# Patient Record
Sex: FEMALE | Race: WHITE | URBAN - METROPOLITAN AREA
[De-identification: names, ages, dates, MRNs, and addresses within clinical notes are randomized per-mention and may not be internally consistent; named-entity substitution may affect disease eponyms.]

---

## 2020-02-13 ENCOUNTER — NEW PATIENT (OUTPATIENT)
Dept: URBAN - METROPOLITAN AREA CLINIC 44 | Facility: CLINIC | Age: 70
End: 2020-02-13
Payer: COMMERCIAL

## 2020-02-13 DIAGNOSIS — H43.813 VITREOUS DEGENERATION, BILATERAL: ICD-10-CM

## 2020-02-13 PROCEDURE — 92004 COMPRE OPH EXAM NEW PT 1/>: CPT | Performed by: OPHTHALMOLOGY

## 2020-02-13 PROCEDURE — 92235 FLUORESCEIN ANGRPH MLTIFRAME: CPT | Performed by: OPHTHALMOLOGY

## 2020-02-13 PROCEDURE — 92134 CPTRZ OPH DX IMG PST SGM RTA: CPT | Performed by: OPHTHALMOLOGY

## 2020-02-13 PROCEDURE — 67028 INJECTION EYE DRUG: CPT | Performed by: OPHTHALMOLOGY

## 2020-02-13 ASSESSMENT — INTRAOCULAR PRESSURE
OS: 16
OD: 17

## 2020-03-12 ENCOUNTER — FOLLOW UP ESTABLISHED (OUTPATIENT)
Dept: URBAN - METROPOLITAN AREA CLINIC 44 | Facility: CLINIC | Age: 70
End: 2020-03-12
Payer: COMMERCIAL

## 2020-03-12 PROCEDURE — 92134 CPTRZ OPH DX IMG PST SGM RTA: CPT | Performed by: OPHTHALMOLOGY

## 2020-03-12 PROCEDURE — 92014 COMPRE OPH EXAM EST PT 1/>: CPT | Performed by: OPHTHALMOLOGY

## 2020-03-12 ASSESSMENT — INTRAOCULAR PRESSURE
OS: 14
OD: 12

## 2020-06-02 ENCOUNTER — NEW PATIENT (OUTPATIENT)
Dept: URBAN - METROPOLITAN AREA CLINIC 43 | Facility: CLINIC | Age: 70
End: 2020-06-02
Payer: COMMERCIAL

## 2020-06-02 DIAGNOSIS — H16.143 PUNCTATE KERATITIS, BILATERAL: Primary | ICD-10-CM

## 2020-06-02 PROCEDURE — 92002 INTRM OPH EXAM NEW PATIENT: CPT | Performed by: OPTOMETRIST

## 2020-06-04 ENCOUNTER — FOLLOW UP ESTABLISHED (OUTPATIENT)
Dept: URBAN - METROPOLITAN AREA CLINIC 44 | Facility: CLINIC | Age: 70
End: 2020-06-04
Payer: COMMERCIAL

## 2020-06-04 PROCEDURE — 92014 COMPRE OPH EXAM EST PT 1/>: CPT | Performed by: OPHTHALMOLOGY

## 2020-06-04 PROCEDURE — 92134 CPTRZ OPH DX IMG PST SGM RTA: CPT | Performed by: OPHTHALMOLOGY

## 2020-06-04 RX ORDER — PREDNISOLONE ACETATE 10 MG/ML
1 % SUSPENSION/ DROPS OPHTHALMIC
Qty: 10 | Refills: 1 | Status: INACTIVE
Start: 2020-06-04 | End: 2020-07-23

## 2020-06-04 RX ORDER — KETOROLAC TROMETHAMINE 5 MG/ML
0.5 % SOLUTION OPHTHALMIC
Qty: 10 | Refills: 0 | Status: INACTIVE
Start: 2020-06-04 | End: 2020-06-04

## 2020-06-04 RX ORDER — KETOROLAC TROMETHAMINE 5 MG/ML
0.5 % SOLUTION OPHTHALMIC
Qty: 10 | Refills: 0 | Status: INACTIVE
Start: 2020-06-04 | End: 2020-07-23

## 2020-06-04 RX ORDER — PREDNISOLONE ACETATE 10 MG/ML
1 % SUSPENSION/ DROPS OPHTHALMIC
Qty: 10 | Refills: 1 | Status: INACTIVE
Start: 2020-06-04 | End: 2020-06-04

## 2020-06-04 ASSESSMENT — INTRAOCULAR PRESSURE
OS: 16
OD: 14

## 2020-07-23 ENCOUNTER — FOLLOW UP ESTABLISHED (OUTPATIENT)
Dept: URBAN - METROPOLITAN AREA CLINIC 44 | Facility: CLINIC | Age: 70
End: 2020-07-23
Payer: COMMERCIAL

## 2020-07-23 PROCEDURE — 92134 CPTRZ OPH DX IMG PST SGM RTA: CPT | Performed by: OPHTHALMOLOGY

## 2020-07-23 PROCEDURE — 92014 COMPRE OPH EXAM EST PT 1/>: CPT | Performed by: OPHTHALMOLOGY

## 2020-07-23 RX ORDER — PREDNISOLONE ACETATE 10 MG/ML
1 % SUSPENSION/ DROPS OPHTHALMIC
Qty: 10 | Refills: 3 | Status: INACTIVE
Start: 2020-07-23 | End: 2020-09-28

## 2020-07-23 RX ORDER — KETOROLAC TROMETHAMINE 5 MG/ML
0.5 % SOLUTION OPHTHALMIC
Qty: 10 | Refills: 3 | Status: INACTIVE
Start: 2020-07-23 | End: 2020-09-28

## 2020-07-23 ASSESSMENT — INTRAOCULAR PRESSURE
OS: 18
OD: 13

## 2020-09-28 ENCOUNTER — FOLLOW UP ESTABLISHED (OUTPATIENT)
Dept: URBAN - METROPOLITAN AREA CLINIC 44 | Facility: CLINIC | Age: 70
End: 2020-09-28
Payer: COMMERCIAL

## 2020-09-28 PROCEDURE — 92014 COMPRE OPH EXAM EST PT 1/>: CPT | Performed by: OPHTHALMOLOGY

## 2020-09-28 PROCEDURE — 92134 CPTRZ OPH DX IMG PST SGM RTA: CPT | Performed by: OPHTHALMOLOGY

## 2020-09-28 PROCEDURE — 67028 INJECTION EYE DRUG: CPT | Performed by: OPHTHALMOLOGY

## 2020-09-28 RX ORDER — KETOROLAC TROMETHAMINE 5 MG/ML
0.5 % SOLUTION OPHTHALMIC
Qty: 10 | Refills: 3 | Status: INACTIVE
Start: 2020-09-28 | End: 2021-02-15

## 2020-09-28 RX ORDER — PREDNISOLONE ACETATE 10 MG/ML
1 % SUSPENSION/ DROPS OPHTHALMIC
Qty: 10 | Refills: 3 | Status: INACTIVE
Start: 2020-09-28 | End: 2021-02-01

## 2020-09-28 ASSESSMENT — INTRAOCULAR PRESSURE
OD: 15
OS: 26

## 2020-10-14 ENCOUNTER — FOLLOW UP ESTABLISHED (OUTPATIENT)
Dept: URBAN - METROPOLITAN AREA CLINIC 44 | Facility: CLINIC | Age: 70
End: 2020-10-14
Payer: COMMERCIAL

## 2020-10-14 PROCEDURE — 76514 ECHO EXAM OF EYE THICKNESS: CPT | Performed by: OPTOMETRIST

## 2020-10-14 PROCEDURE — 92133 CPTRZD OPH DX IMG PST SGM ON: CPT | Performed by: OPTOMETRIST

## 2020-10-14 PROCEDURE — 92012 INTRM OPH EXAM EST PATIENT: CPT | Performed by: OPTOMETRIST

## 2020-10-14 RX ORDER — DORZOLAMIDE HCL 20 MG/ML
2 % SOLUTION/ DROPS OPHTHALMIC
Qty: 1 | Refills: 5 | Status: INACTIVE
Start: 2020-10-14 | End: 2021-02-15

## 2020-10-14 RX ORDER — BRIMONIDINE TARTRATE 2 MG/ML
0.2 % SOLUTION/ DROPS OPHTHALMIC
Qty: 1 | Refills: 5 | Status: INACTIVE
Start: 2020-10-14 | End: 2021-02-15

## 2020-10-14 ASSESSMENT — INTRAOCULAR PRESSURE
OS: 47
OD: 18
OS: 48
OD: 20

## 2020-10-20 ENCOUNTER — FOLLOW UP ESTABLISHED (OUTPATIENT)
Dept: URBAN - METROPOLITAN AREA CLINIC 44 | Facility: CLINIC | Age: 70
End: 2020-10-20
Payer: COMMERCIAL

## 2020-10-20 PROCEDURE — 92083 EXTENDED VISUAL FIELD XM: CPT | Performed by: OPTOMETRIST

## 2020-10-20 PROCEDURE — 92012 INTRM OPH EXAM EST PATIENT: CPT | Performed by: OPTOMETRIST

## 2020-10-20 ASSESSMENT — INTRAOCULAR PRESSURE
OS: 16
OD: 11
OD: 21
OS: 21

## 2020-11-16 ENCOUNTER — FOLLOW UP ESTABLISHED (OUTPATIENT)
Dept: URBAN - METROPOLITAN AREA CLINIC 44 | Facility: CLINIC | Age: 70
End: 2020-11-16
Payer: COMMERCIAL

## 2020-11-16 PROCEDURE — 92134 CPTRZ OPH DX IMG PST SGM RTA: CPT | Performed by: OPHTHALMOLOGY

## 2020-11-16 PROCEDURE — 92014 COMPRE OPH EXAM EST PT 1/>: CPT | Performed by: OPHTHALMOLOGY

## 2020-11-16 ASSESSMENT — INTRAOCULAR PRESSURE
OD: 14
OS: 28

## 2021-02-01 ENCOUNTER — FOLLOW UP ESTABLISHED (OUTPATIENT)
Dept: URBAN - METROPOLITAN AREA CLINIC 44 | Facility: CLINIC | Age: 71
End: 2021-02-01
Payer: COMMERCIAL

## 2021-02-01 PROCEDURE — 99214 OFFICE O/P EST MOD 30 MIN: CPT | Performed by: OPTOMETRIST

## 2021-02-01 ASSESSMENT — INTRAOCULAR PRESSURE
OS: 13
OD: 10

## 2021-02-15 ENCOUNTER — FOLLOW UP ESTABLISHED (OUTPATIENT)
Dept: URBAN - METROPOLITAN AREA CLINIC 44 | Facility: CLINIC | Age: 71
End: 2021-02-15
Payer: COMMERCIAL

## 2021-02-15 PROCEDURE — 67028 INJECTION EYE DRUG: CPT | Performed by: OPHTHALMOLOGY

## 2021-02-15 PROCEDURE — 92014 COMPRE OPH EXAM EST PT 1/>: CPT | Performed by: OPHTHALMOLOGY

## 2021-02-15 PROCEDURE — 92134 CPTRZ OPH DX IMG PST SGM RTA: CPT | Performed by: OPHTHALMOLOGY

## 2021-02-15 RX ORDER — KETOROLAC TROMETHAMINE 5 MG/ML
0.5 % SOLUTION OPHTHALMIC
Qty: 10 | Refills: 3 | Status: INACTIVE
Start: 2021-02-15 | End: 2021-04-02

## 2021-02-15 RX ORDER — DORZOLAMIDE HCL 20 MG/ML
2 % SOLUTION/ DROPS OPHTHALMIC
Qty: 2.5 | Refills: 5 | Status: INACTIVE
Start: 2021-02-15 | End: 2021-04-02

## 2021-02-15 RX ORDER — BRIMONIDINE TARTRATE 2 MG/ML
0.2 % SOLUTION/ DROPS OPHTHALMIC
Qty: 2.5 | Refills: 5 | Status: INACTIVE
Start: 2021-02-15 | End: 2021-04-02

## 2021-02-15 ASSESSMENT — INTRAOCULAR PRESSURE
OS: 27
OD: 15

## 2021-03-22 ENCOUNTER — FOLLOW UP ESTABLISHED (OUTPATIENT)
Dept: URBAN - METROPOLITAN AREA CLINIC 44 | Facility: CLINIC | Age: 71
End: 2021-03-22
Payer: COMMERCIAL

## 2021-03-22 PROCEDURE — 99214 OFFICE O/P EST MOD 30 MIN: CPT | Performed by: OPTOMETRIST

## 2021-03-22 PROCEDURE — 92134 CPTRZ OPH DX IMG PST SGM RTA: CPT | Performed by: OPTOMETRIST

## 2021-03-22 ASSESSMENT — INTRAOCULAR PRESSURE
OD: 17
OS: 49
OS: 43

## 2021-03-24 ENCOUNTER — FOLLOW UP ESTABLISHED (OUTPATIENT)
Dept: URBAN - METROPOLITAN AREA CLINIC 44 | Facility: CLINIC | Age: 71
End: 2021-03-24
Payer: COMMERCIAL

## 2021-03-24 PROCEDURE — 99213 OFFICE O/P EST LOW 20 MIN: CPT | Performed by: OPTOMETRIST

## 2021-03-24 ASSESSMENT — INTRAOCULAR PRESSURE
OS: 20
OS: 40
OD: 20
OD: 16

## 2021-04-02 ENCOUNTER — OFFICE VISIT (OUTPATIENT)
Dept: URBAN - METROPOLITAN AREA CLINIC 44 | Facility: CLINIC | Age: 71
End: 2021-04-02
Payer: COMMERCIAL

## 2021-04-02 DIAGNOSIS — H40.052 OCULAR HYPERTENSION, LEFT EYE: Primary | ICD-10-CM

## 2021-04-02 DIAGNOSIS — H04.123 TEAR FILM INSUFFICIENCY OF BILATERAL LACRIMAL GLANDS: ICD-10-CM

## 2021-04-02 PROCEDURE — 92082 INTERMEDIATE VISUAL FIELD XM: CPT | Performed by: OPHTHALMOLOGY

## 2021-04-02 PROCEDURE — 92020 GONIOSCOPY: CPT | Performed by: OPHTHALMOLOGY

## 2021-04-02 PROCEDURE — 92014 COMPRE OPH EXAM EST PT 1/>: CPT | Performed by: OPHTHALMOLOGY

## 2021-04-02 RX ORDER — NETARSUDIL 0.2 MG/ML
0.02 % SOLUTION/ DROPS OPHTHALMIC; TOPICAL
Qty: 2.5 | Refills: 2 | Status: INACTIVE
Start: 2021-04-02 | End: 2021-08-24

## 2021-04-02 RX ORDER — BRIMONIDINE TARTRATE 2 MG/ML
0.2 % SOLUTION/ DROPS OPHTHALMIC
Qty: 2.5 | Refills: 2 | Status: INACTIVE
Start: 2021-04-02 | End: 2021-06-21

## 2021-04-02 RX ORDER — DORZOLAMIDE HYDROCHLORIDE AND TIMOLOL MALEATE 20; 5 MG/ML; MG/ML
SOLUTION/ DROPS OPHTHALMIC
Qty: 5 | Refills: 2 | Status: INACTIVE
Start: 2021-04-02 | End: 2021-06-21

## 2021-04-02 ASSESSMENT — INTRAOCULAR PRESSURE
OS: 34
OD: 22

## 2021-04-02 NOTE — IMPRESSION/PLAN
Impression: Ocular hypertension, left eye Hx of CME OS -- no PGA  ; Denies lung problems 
   ; Patient denies sulfa allergy or kidney problems ;  Steroid responder Plan: PLAN: On brimonidine TID OS, dorzolamide TID OS OFF diamox  ( has side effects)  ; FDT shows dense depression OS and full OD. IOP is sub-optimal os, likely steroid -related , pt asked again - denies any lung problems and denies cough - will start delfino but told to stop if has any side effects  ,  cont brim tid os, change dorzol to cosopt bid os, add rhopressa qhs os, and rtc in 1 week , consider tritsan Man for possible surgical options also        ((TARGET ~< to determine OU)) /son present TESTS:
04/02/21 FDT - OD: Good-overall full; OS: Good-dense superior and inferior depression. See today's plan for further clinical correlation and recommendations based on test results and other clinical findings. Discussed glaucoma diagnosis in detail with patient. Emphasized and explained compliance. Poor compliance can lead to blindness.  Educational materials provided

## 2021-04-02 NOTE — IMPRESSION/PLAN
Impression: Vitreous degeneration, bilateral Plan: Discussed signs and symptoms of retinal detachment (flashes,floaters, curtain) as precaution . Patient instructed to call or return to clinic if condition gets worse.

## 2021-04-08 ENCOUNTER — OFFICE VISIT (OUTPATIENT)
Dept: URBAN - METROPOLITAN AREA CLINIC 44 | Facility: CLINIC | Age: 71
End: 2021-04-08
Payer: COMMERCIAL

## 2021-04-08 PROCEDURE — 99213 OFFICE O/P EST LOW 20 MIN: CPT | Performed by: OPTOMETRIST

## 2021-04-08 ASSESSMENT — INTRAOCULAR PRESSURE
OS: 17
OD: 15
OS: 23
OD: 16

## 2021-04-08 NOTE — IMPRESSION/PLAN
Impression: Ocular hypertension, left eye Plan: Cupping OS>OD Pachymetry: 688/107 IOP: 16/23 Current meds: brimonidine TID OS, cosopt BID OS, rhopressa QHS OS Hx of CME OS -- no PGA Hx of chronic cough -- no beta blockers Patient denies sulfa allergy or kidney problems Steroid responder OCT RNFL (10/14/20): OD 82 (wnl) OS 97 (wnl) HVF (10/20/20): OD nasal step (unreliable) OS mild nasal defect (reliable) IOP improved after switching meds but IOP suboptimal OS. Continue current meds.   Refer to Dr. Alfonso Weems

## 2021-04-15 ENCOUNTER — OFFICE VISIT (OUTPATIENT)
Dept: URBAN - METROPOLITAN AREA CLINIC 44 | Facility: CLINIC | Age: 71
End: 2021-04-15
Payer: COMMERCIAL

## 2021-04-15 DIAGNOSIS — H35.352 CYSTOID MACULAR DEGENERATION, LEFT EYE: Primary | ICD-10-CM

## 2021-04-15 PROCEDURE — 92134 CPTRZ OPH DX IMG PST SGM RTA: CPT | Performed by: OPHTHALMOLOGY

## 2021-04-15 PROCEDURE — 92014 COMPRE OPH EXAM EST PT 1/>: CPT | Performed by: OPHTHALMOLOGY

## 2021-04-15 RX ORDER — KETOROLAC TROMETHAMINE 5 MG/ML
0.5 % SOLUTION OPHTHALMIC
Qty: 10 | Refills: 3 | Status: INACTIVE
Start: 2021-04-15 | End: 2021-04-15

## 2021-04-15 RX ORDER — KETOROLAC TROMETHAMINE 5 MG/ML
0.5 % SOLUTION OPHTHALMIC
Qty: 10 | Refills: 3 | Status: INACTIVE
Start: 2021-04-15 | End: 2021-06-21

## 2021-04-15 ASSESSMENT — INTRAOCULAR PRESSURE
OS: 14
OD: 10

## 2021-04-15 NOTE — IMPRESSION/PLAN
Impression: Vitreous degeneration, bilateral Plan: no retinal breaks detected. s/sx RD and tears reviewed. call if any new symptoms develop.

## 2021-05-26 NOTE — IMPRESSION/PLAN
Impression: Cystoid macular degeneration, left eye Plan: CME resolved 2 month s/p IVTA, hx of recurrence. cont ketorolac TID. will monitor RTC 2 months done done

## 2021-06-21 ENCOUNTER — OFFICE VISIT (OUTPATIENT)
Dept: URBAN - METROPOLITAN AREA CLINIC 44 | Facility: CLINIC | Age: 71
End: 2021-06-21
Payer: COMMERCIAL

## 2021-06-21 PROCEDURE — 92134 CPTRZ OPH DX IMG PST SGM RTA: CPT | Performed by: OPHTHALMOLOGY

## 2021-06-21 PROCEDURE — 92014 COMPRE OPH EXAM EST PT 1/>: CPT | Performed by: OPHTHALMOLOGY

## 2021-06-21 RX ORDER — KETOROLAC TROMETHAMINE 5 MG/ML
0.5 % SOLUTION OPHTHALMIC
Qty: 10 | Refills: 3 | Status: ACTIVE
Start: 2021-06-21

## 2021-06-21 RX ORDER — BRIMONIDINE TARTRATE 2 MG/ML
0.2 % SOLUTION/ DROPS OPHTHALMIC
Qty: 2.5 | Refills: 2 | Status: INACTIVE
Start: 2021-06-21 | End: 2021-08-24

## 2021-06-21 RX ORDER — DORZOLAMIDE HYDROCHLORIDE AND TIMOLOL MALEATE 20; 5 MG/ML; MG/ML
SOLUTION/ DROPS OPHTHALMIC
Qty: 5 | Refills: 2 | Status: INACTIVE
Start: 2021-06-21 | End: 2021-08-24

## 2021-06-21 ASSESSMENT — INTRAOCULAR PRESSURE
OD: 12
OS: 20

## 2021-06-21 NOTE — IMPRESSION/PLAN
Impression: Ocular hypertension, left eye Plan: cont care w Dr Vannesa Machado, IOP elevated OD, sent refills of drops

## 2021-06-21 NOTE — IMPRESSION/PLAN
Impression: Cystoid macular degeneration, left eye Plan: CME remains resolved 4 month s/p IVTA, hx of recurrence. cont ketorolac TID. will hold off on injection today and continue to monitor RTC 2 months

## 2021-08-19 ENCOUNTER — OFFICE VISIT (OUTPATIENT)
Dept: URBAN - METROPOLITAN AREA CLINIC 44 | Facility: CLINIC | Age: 71
End: 2021-08-19
Payer: COMMERCIAL

## 2021-08-19 DIAGNOSIS — H02.825 CYSTS OF LEFT LOWER EYELID: ICD-10-CM

## 2021-08-19 PROCEDURE — 92134 CPTRZ OPH DX IMG PST SGM RTA: CPT | Performed by: OPHTHALMOLOGY

## 2021-08-19 PROCEDURE — 67028 INJECTION EYE DRUG: CPT | Performed by: OPHTHALMOLOGY

## 2021-08-19 PROCEDURE — 92014 COMPRE OPH EXAM EST PT 1/>: CPT | Performed by: OPHTHALMOLOGY

## 2021-08-19 ASSESSMENT — INTRAOCULAR PRESSURE
OS: 19
OD: 16

## 2021-08-19 NOTE — IMPRESSION/PLAN
Impression: Cystoid macular degeneration, left eye: H35.352. Plan: increased edema 6 months s/p IVTA. will repeat IVTA OS today.  cont Ketorolac TID

RTC 2 months

## 2021-08-24 ENCOUNTER — OFFICE VISIT (OUTPATIENT)
Dept: URBAN - METROPOLITAN AREA CLINIC 44 | Facility: CLINIC | Age: 71
End: 2021-08-24
Payer: COMMERCIAL

## 2021-08-24 DIAGNOSIS — H40.1114 PRIMARY OPEN-ANGLE GLAUCOMA, RIGHT EYE, INDETERMINATE STAGE: ICD-10-CM

## 2021-08-24 PROCEDURE — 92020 GONIOSCOPY: CPT | Performed by: OPHTHALMOLOGY

## 2021-08-24 PROCEDURE — 92014 COMPRE OPH EXAM EST PT 1/>: CPT | Performed by: OPHTHALMOLOGY

## 2021-08-24 PROCEDURE — 92083 EXTENDED VISUAL FIELD XM: CPT | Performed by: OPHTHALMOLOGY

## 2021-08-24 PROCEDURE — 92133 CPTRZD OPH DX IMG PST SGM ON: CPT | Performed by: OPHTHALMOLOGY

## 2021-08-24 RX ORDER — NETARSUDIL 0.2 MG/ML
0.02 % SOLUTION/ DROPS OPHTHALMIC; TOPICAL
Qty: 7.5 | Refills: 2 | Status: INACTIVE
Start: 2021-08-24 | End: 2022-02-07

## 2021-08-24 RX ORDER — ACETAZOLAMIDE 250 MG/1
250 MG TABLET ORAL
Qty: 90 | Refills: 0 | Status: ACTIVE
Start: 2021-08-24

## 2021-08-24 ASSESSMENT — VISUAL ACUITY
OS: 20/25
OD: 20/25

## 2021-08-24 ASSESSMENT — INTRAOCULAR PRESSURE
OS: 36
OD: 21

## 2021-08-24 NOTE — IMPRESSION/PLAN
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 

1 Plan: Trabeculectomy is recommended. Risks, benefits, alternatives to surgery discussed including pain, bleeding, loss of vision, loss of eye, need for further surgery, double vision, retinal problems, infection, and inflammation. Although surgery is expected to improve the condition, the patient understands it is possible that the condition could worsen in the future. The surgery is being done in an attempt to preserve the current level of vision; vision improvement is NOT expected. Glasses will be necessary for sharpest vision after surgery. Patient advised that ongoing uncontrolled glaucoma may result in permanent vision loss. Discussed in detail the commitment of post operative care, patient may need to travel to different offices. Discussed activity restrictions: no bending head below waist, rubbing the eye, straining or lifting over 10 lbs, stay out of michael, dirty areas and shield when sleeping until advised. All questions answered.

## 2021-08-24 NOTE — IMPRESSION/PLAN
Impression: Primary open-angle glaucoma, right eye, indeterminate stage: H40.1114.  Plan: see [id number]

## 2021-08-30 NOTE — IMPRESSION/PLAN
Impression: Other specified glaucoma: H40.89.


2 Plan: Patient wishes to proceed with surgery. Schedule: Trabeculectomy OS, H&P, POD1, POW1, POW2, POW3, POW4, POW5, POW6, POW7 and POW8
-Patient to start FML QID [id number] prior to Trabeculectomy if able. -Start Ofloxacin QID OS one day prior to prior to Trabeculectomy. -ERx'd FML QID OS, Ofloxacin QID OS and Durezol QID OS as requested. 
-Continue Glaucoma Drops: Start Brimonidine TID OU (from OS only) and DIAMOX 250mg PO BID. Continue Rhopressa QHS OS and Cosopt BID OS. -Patient will continue glaucoma drops as directed until surgery. After surgery, patient will stop glaucoma drops ONLY in the surgical eye. Start surgical drops when eye patch is removed 5hrs after surgery.

## 2021-09-02 ENCOUNTER — ADULT PHYSICAL (OUTPATIENT)
Dept: URBAN - METROPOLITAN AREA CLINIC 44 | Facility: CLINIC | Age: 71
End: 2021-09-02
Payer: COMMERCIAL

## 2021-09-02 PROCEDURE — 99203 OFFICE O/P NEW LOW 30 MIN: CPT | Performed by: PHYSICIAN ASSISTANT

## 2021-09-02 ASSESSMENT — PACHYMETRY
OD: 23.41
OS: 23.49

## 2021-10-14 ENCOUNTER — OFFICE VISIT (OUTPATIENT)
Dept: URBAN - METROPOLITAN AREA CLINIC 44 | Facility: CLINIC | Age: 71
End: 2021-10-14
Payer: COMMERCIAL

## 2021-10-14 PROCEDURE — 92014 COMPRE OPH EXAM EST PT 1/>: CPT | Performed by: OPHTHALMOLOGY

## 2021-10-14 PROCEDURE — 92134 CPTRZ OPH DX IMG PST SGM RTA: CPT | Performed by: OPHTHALMOLOGY

## 2021-10-14 ASSESSMENT — INTRAOCULAR PRESSURE
OS: 36
OD: 23

## 2021-10-14 NOTE — IMPRESSION/PLAN
Impression: Cystoid macular degeneration, left eye: H35.352. Plan: s/p IVTA 8/2021. CME now resolved. high risk for recurrence. long discussion of d/c tx, risk for irrecoverable vision loss, pt elects to d/c tx and monitor for now. 

RTC 2 months

## 2021-10-14 NOTE — IMPRESSION/PLAN
Impression: Ocular hypertension, left eye Plan: IOP 36, cont drops, recommend cont with surgery as recommended by Dr Rossy Altman. long discussions, pt elects to defer all injections and surgery. understands risk of irrecoverable vision loss. will call if wanting to proceed.  cont all drops as directed

## 2021-10-21 ENCOUNTER — OFFICE VISIT (OUTPATIENT)
Dept: URBAN - METROPOLITAN AREA CLINIC 44 | Facility: CLINIC | Age: 71
End: 2021-10-21
Payer: COMMERCIAL

## 2021-10-21 DIAGNOSIS — H02.831 DERMATOCHALASIS OF RIGHT UPPER EYELID: Primary | ICD-10-CM

## 2021-10-21 PROCEDURE — 92012 INTRM OPH EXAM EST PATIENT: CPT | Performed by: OPHTHALMOLOGY

## 2021-10-21 PROCEDURE — 92081 LIMITED VISUAL FIELD XM: CPT | Performed by: OPHTHALMOLOGY

## 2021-10-21 PROCEDURE — 92285 EXTERNAL OCULAR PHOTOGRAPHY: CPT | Performed by: OPHTHALMOLOGY

## 2021-10-21 RX ORDER — NEOMYCIN SULFATE, POLYMYXIN B SULFATE AND DEXAMETHASONE 3.5; 10000; 1 MG/G; [USP'U]/G; MG/G
OINTMENT OPHTHALMIC
Qty: 1 | Refills: 3 | Status: INACTIVE
Start: 2021-10-21 | End: 2021-10-21

## 2021-10-21 NOTE — IMPRESSION/PLAN
Impression: Dermatochalasis of right upper eyelid: H02.831. Plan: Visual Fields & photos were performed and interpreted today and demonstrated restriction of superior and temporal visual fields. This reverted to normal, demonstrating >30% improvement in visual field with mechanical elevation of the eyelid and brow. To correct the loss of superior and temporal VF caused by redundant upper eyelid skin and muscle, I recommended performing an upper lid blepharoplasty. Discussed the R/B/A of this procedure, all questions were answered. Visual field shows restriction of superior and temporal visual fields in resting position. With eyelids/brows elevated/taped there is a significant (>30%) improvement in the superior and temporal visual fields. skin and medial fat OU. explained that lateral hooding would still be present OU without a cosmetic brow lift. Patient voiced understanding and would like to move forward with BULB only.

## 2021-11-24 ENCOUNTER — ADULT PHYSICAL (OUTPATIENT)
Dept: URBAN - METROPOLITAN AREA CLINIC 44 | Facility: CLINIC | Age: 71
End: 2021-11-24
Payer: COMMERCIAL

## 2021-11-24 DIAGNOSIS — H02.834 DERMATOCHALASIS OF LEFT UPPER EYELID: ICD-10-CM

## 2021-11-24 DIAGNOSIS — Z01.818 ENCOUNTER FOR OTHER PREPROCEDURAL EXAMINATION: Primary | ICD-10-CM

## 2021-11-24 PROCEDURE — 99213 OFFICE O/P EST LOW 20 MIN: CPT

## 2021-12-08 ENCOUNTER — OFFICE VISIT (OUTPATIENT)
Dept: URBAN - METROPOLITAN AREA CLINIC 10 | Facility: CLINIC | Age: 71
End: 2021-12-08
Payer: COMMERCIAL

## 2021-12-08 DIAGNOSIS — H40.89 OTHER SPECIFIED GLAUCOMA: ICD-10-CM

## 2021-12-08 PROCEDURE — 92014 COMPRE OPH EXAM EST PT 1/>: CPT | Performed by: OPHTHALMOLOGY

## 2021-12-08 PROCEDURE — 92134 CPTRZ OPH DX IMG PST SGM RTA: CPT | Performed by: OPHTHALMOLOGY

## 2021-12-08 ASSESSMENT — INTRAOCULAR PRESSURE
OD: 20
OS: 33

## 2021-12-08 NOTE — IMPRESSION/PLAN
Impression: Cystoid macular degeneration, left eye: H35.352. Plan: s/p IVTA 8/2021. CME increased from prior. long discussion about injection tx, understands we CANNOT proceed with further injections without also proceeding with glaucoma procedure. pt elects to proceed with Ozurdex Injection and will schedule for glaucoma surgery RTC 1 month ND Ozurdex OS

## 2021-12-08 NOTE — IMPRESSION/PLAN
Impression: Ocular hypertension, left eye Plan: IOP 33, cont drops, pt elects to proceed with surgery.  will reach out to glaucoma team

## 2021-12-09 ENCOUNTER — SURGERY (OUTPATIENT)
Dept: URBAN - METROPOLITAN AREA SURGERY 19 | Facility: SURGERY | Age: 71
End: 2021-12-09
Payer: COMMERCIAL

## 2021-12-23 ENCOUNTER — POST-OPERATIVE VISIT (OUTPATIENT)
Dept: URBAN - METROPOLITAN AREA CLINIC 33 | Facility: CLINIC | Age: 71
End: 2021-12-23
Payer: COMMERCIAL

## 2021-12-23 DIAGNOSIS — Z48.89 ENCOUNTER FOR OTHER SPECIFIED SURGICAL AFTERCARE: Primary | ICD-10-CM

## 2021-12-23 PROCEDURE — 99024 POSTOP FOLLOW-UP VISIT: CPT | Performed by: OPHTHALMOLOGY

## 2021-12-23 NOTE — IMPRESSION/PLAN
Impression:  Encounter for other specified surgical aftercare  Z48.89. Plan: Sutures removed in office today; healing well. explained that swelling will improve over time. RTC PRN.

## 2022-02-07 ENCOUNTER — OFFICE VISIT (OUTPATIENT)
Dept: URBAN - METROPOLITAN AREA CLINIC 44 | Facility: CLINIC | Age: 72
End: 2022-02-07
Payer: COMMERCIAL

## 2022-02-07 PROCEDURE — 92134 CPTRZ OPH DX IMG PST SGM RTA: CPT | Performed by: OPHTHALMOLOGY

## 2022-02-07 PROCEDURE — 92014 COMPRE OPH EXAM EST PT 1/>: CPT | Performed by: OPHTHALMOLOGY

## 2022-02-07 RX ORDER — NETARSUDIL 0.2 MG/ML
0.02 % SOLUTION/ DROPS OPHTHALMIC; TOPICAL
Qty: 7.5 | Refills: 2 | Status: ACTIVE
Start: 2022-02-07

## 2022-02-07 RX ORDER — DUREZOL 0.5 MG/ML
0.05 % EMULSION OPHTHALMIC
Qty: 10 | Refills: 3 | Status: ACTIVE
Start: 2022-02-07

## 2022-02-07 RX ORDER — BRIMONIDINE TARTRATE 2 MG/ML
0.2 % SOLUTION/ DROPS OPHTHALMIC
Qty: 15 | Refills: 2 | Status: ACTIVE
Start: 2022-02-07

## 2022-02-07 RX ORDER — DORZOLAMIDE HYDROCHLORIDE AND TIMOLOL MALEATE 20; 5 MG/ML; MG/ML
SOLUTION/ DROPS OPHTHALMIC
Qty: 15 | Refills: 2 | Status: INACTIVE
Start: 2022-02-07 | End: 2022-02-07

## 2022-02-07 RX ORDER — DUREZOL 0.5 MG/ML
0.05 % EMULSION OPHTHALMIC
Qty: 10 | Refills: 3 | Status: INACTIVE
Start: 2022-02-07 | End: 2022-02-07

## 2022-02-07 RX ORDER — BRIMONIDINE TARTRATE 2 MG/ML
0.2 % SOLUTION/ DROPS OPHTHALMIC
Qty: 15 | Refills: 2 | Status: INACTIVE
Start: 2022-02-07 | End: 2022-02-07

## 2022-02-07 RX ORDER — DORZOLAMIDE HYDROCHLORIDE AND TIMOLOL MALEATE 20; 5 MG/ML; MG/ML
SOLUTION/ DROPS OPHTHALMIC
Qty: 15 | Refills: 2 | Status: ACTIVE
Start: 2022-02-07

## 2022-02-07 ASSESSMENT — INTRAOCULAR PRESSURE
OD: 27
OS: 42

## 2022-02-07 NOTE — IMPRESSION/PLAN
Impression: Cystoid macular degeneration, left eye: H35.352. Plan: s/p IVTA 8/2021, s/p Ozurdex OS 12/2021. non-complaint with previous recommendations, did no follow up with glaucoma. have had several long discussions about injection treatments and glaucoma surgery. IOP now 42, will not proceed with additional CME treatment until she schedules with glaucoma. high for irrecoverable vision loss without glaucoma surgery. 

RTC ASAP glaucoma

## 2022-02-23 ENCOUNTER — OFFICE VISIT (OUTPATIENT)
Dept: URBAN - METROPOLITAN AREA CLINIC 44 | Facility: CLINIC | Age: 72
End: 2022-02-23
Payer: COMMERCIAL

## 2022-02-23 DIAGNOSIS — H20.012 PRIMARY IRIDOCYCLITIS OF LEFT EYE: Primary | ICD-10-CM

## 2022-02-23 PROCEDURE — 99214 OFFICE O/P EST MOD 30 MIN: CPT | Performed by: OPTOMETRIST

## 2022-02-23 PROCEDURE — 92133 CPTRZD OPH DX IMG PST SGM ON: CPT | Performed by: OPTOMETRIST

## 2022-02-23 RX ORDER — PREDNISOLONE ACETATE 10 MG/ML
1 % SUSPENSION/ DROPS OPHTHALMIC
Qty: 5 | Refills: 0 | Status: ACTIVE
Start: 2022-02-23

## 2022-02-23 ASSESSMENT — INTRAOCULAR PRESSURE
OS: 16
OD: 20

## 2022-02-23 NOTE — IMPRESSION/PLAN
Impression: Ocular hypertension, left eye
=IOP OD 20, OS 16. 
-Vertical cupping OD .71 OS . 70 with RNFL average OD 82 OS 80 Plan: PLAN: Cont current medications and RTC w Dr Helen Caban as sched

## 2022-02-23 NOTE — IMPRESSION/PLAN
Impression: Primary iridocyclitis of left eye: H20.012.  Plan: PLAN: Discussed findings with patient, and start Pred Forte 1 drop TID OS and RTC 1 week for short Alternatives Discussed Intro (Do Not Add Period): I discussed alternative treatments to Mohs surgery and specifically discussed the risks and benefits of

## 2022-03-09 ENCOUNTER — OFFICE VISIT (OUTPATIENT)
Dept: URBAN - METROPOLITAN AREA CLINIC 44 | Facility: CLINIC | Age: 72
End: 2022-03-09
Payer: COMMERCIAL

## 2022-03-09 PROCEDURE — 99214 OFFICE O/P EST MOD 30 MIN: CPT | Performed by: OPHTHALMOLOGY

## 2022-03-09 PROCEDURE — 92083 EXTENDED VISUAL FIELD XM: CPT | Performed by: OPHTHALMOLOGY

## 2022-03-09 ASSESSMENT — INTRAOCULAR PRESSURE
OS: 31
OD: 20

## 2022-03-09 NOTE — IMPRESSION/PLAN
Impression: Other specified glaucoma: H40.89.


2 Plan: PT HAS POAG OS
PT IS UNDER CARE OF DR Antwon Sanabria, PT DEFERRED TRABECULECTOMY OS X 2 PT DENIES FAMILY HX OF GLAUCOMA
PT DENIES SULFA ALLERGY 
PT DENIES LUNG DZ 
TARGET IOP MID TEENS OR LESS 
RECOMMEND : 
1. CONTINUE BRIMONIDINE TID OS 2. CONTINUE BID OS 3. CONTINUE CARE WITH DR HUTSON TO PROCEED WITH STEROID MONITORING AND TAPER WHEN IT IS SAFE 4. CONTINUE CARE WITH DR GOLISCH SO SHE CAN DETERMINE IF SHE WISHES TO UNDERGO SURGICAL INTERVENTION 5. VF OS ON 3/9/22 REVEALS NOTABLE PROGRESSIVE LOSS, WE DISCUSSED WITH THE PT AND HER SON THAT FAILURE TO FOLLOW THE PLAN OF DR Antwon Sanabria WILL LEAVE HER AT RISK FOR PROGRESSIVE GLAUCOMATOUS SIGHT LOSS

## 2022-03-24 ENCOUNTER — OFFICE VISIT (OUTPATIENT)
Dept: URBAN - METROPOLITAN AREA CLINIC 44 | Facility: CLINIC | Age: 72
End: 2022-03-24
Payer: COMMERCIAL

## 2022-03-24 DIAGNOSIS — H57.12 OCULAR PAIN, LEFT EYE: Primary | ICD-10-CM

## 2022-03-24 PROCEDURE — 92134 CPTRZ OPH DX IMG PST SGM RTA: CPT | Performed by: OPTOMETRIST

## 2022-03-24 PROCEDURE — 99213 OFFICE O/P EST LOW 20 MIN: CPT | Performed by: OPTOMETRIST

## 2022-03-24 ASSESSMENT — INTRAOCULAR PRESSURE
OS: 23
OD: 17

## 2022-03-24 NOTE — IMPRESSION/PLAN
Impression: Ocular pain, left eye: H57.12. Non specific. Further questions indicated FB type pain, but constant 24/7. 1-2+ Diffuse SPK not consistent with symptoms. States no improvement with Pred Forte. Review of Hx shows current problem appears to be recurrent with no relieve from previous Tx. Plan: PLAN: Discussed findings. Rec Celluvisc 4X or more. RTC 1 week for office visit. Consider retinal consult if no better.

## 2022-03-24 NOTE — IMPRESSION/PLAN
Impression: Other specified glaucoma: H40.89. IOP OD 17, OS 23. 
PT HAS POAG OS
PT IS UNDER CARE OF DR Gabriel Mcintosh, PT DEFERRED TRABECULECTOMY OS X 2 PT DENIES FAMILY HX OF GLAUCOMA
PT DENIES SULFA ALLERGY 
PT DENIES LUNG DZ 
TARGET IOP MID TEENS OR LESS Plan: RECOMMEND : 
1. CONTINUE BRIMONIDINE TID OS 2. CONTINUE COSOPT BID OS 3. CONTINUE CARE WITH DR GOLISCH SO SHE CAN DETERMINE IF SHE WISHES TO UNDERGO SURGICAL INTERVENTION 5. VF OS ON 3/9/22 REVEALS NOTABLE PROGRESSIVE LOSS, WE DISCUSSED WITH THE PT AND HER SON THAT FAILURE TO FOLLOW THE PLAN OF DR Gabriel Mcintosh WILL LEAVE HER AT RISK FOR PROGRESSIVE GLAUCOMATOUS SIGHT LOSS

## 2022-03-31 ENCOUNTER — OFFICE VISIT (OUTPATIENT)
Dept: URBAN - METROPOLITAN AREA CLINIC 44 | Facility: CLINIC | Age: 72
End: 2022-03-31
Payer: COMMERCIAL

## 2022-03-31 PROCEDURE — 99213 OFFICE O/P EST LOW 20 MIN: CPT | Performed by: OPTOMETRIST

## 2022-03-31 ASSESSMENT — INTRAOCULAR PRESSURE
OD: 19
OS: 36

## 2022-03-31 NOTE — IMPRESSION/PLAN
Impression: Other specified glaucoma: H40.89. IOP OD 19, OS 36. Patient D/C Dorzolamide/timolol due to suspect intolerance per patient. States eye felt slightly better when stopping drop. (D/C 2 days ago) PT HAS POAG OS
PT IS UNDER CARE OF DR Lauren Hilliard, PT DEFERRED TRABECULECTOMY OS X 2 PT DENIES FAMILY HX OF GLAUCOMA
PT DENIES SULFA ALLERGY 
PT DENIES LUNG DZ 
TARGET IOP MID TEENS OR LESS Plan: PLAN: Discussed findings. 1. CONTINUE BRIMONIDINE TID OS 2. Restart Dorzolimide/timolol  BID OS 3.  RTC 1 week to check IOP

## 2022-03-31 NOTE — IMPRESSION/PLAN
Impression: Ocular pain, left eye: H57.12. Non specific. Further questions indicated FB type pain, SLE findings consistent with DED. 2++ superior SPK with mild diffuse SPK. States Celluvisc has helped , but no improvement with Pred Forte. Review of Hx shows current problem appears to be recurrent with no relieve from previous Tx. Plan: PLAN: Discussed findings. Rec cont Celluvisc 4X or more. RTC 1 week.

## 2022-03-31 NOTE — IMPRESSION/PLAN
Impression: Tear film insufficiency of bilateral lacrimal glands: H04.123. Patient with symptomatic DED. Clinical evaluation shows moderate DED clinical signs Plan: PLAN: Lipid based tears to be used 3-4 X daily. Celluvisc QID OS. Rec. 2000 mg Triglyceride based fish oil (Example: Coromega, PRN) to be taken daily. RTC 1 week. States no improvement with Pred.

## 2022-04-25 ENCOUNTER — OFFICE VISIT (OUTPATIENT)
Dept: URBAN - METROPOLITAN AREA CLINIC 44 | Facility: CLINIC | Age: 72
End: 2022-04-25
Payer: COMMERCIAL

## 2022-04-25 DIAGNOSIS — H57.12 OCULAR PAIN, LEFT EYE: ICD-10-CM

## 2022-04-25 PROCEDURE — 99214 OFFICE O/P EST MOD 30 MIN: CPT | Performed by: OPTOMETRIST

## 2022-04-25 ASSESSMENT — INTRAOCULAR PRESSURE
OD: 16
OS: 23

## 2022-04-25 NOTE — IMPRESSION/PLAN
Impression: Ocular pain, left eye: H57.12. Patient feels discomfort maybe due to Hx of Migraines, but pain is non specific and not consistent. Further questions indicated FB type pain, SLE findings consistent with DED. 2++ superior SPK with mild diffuse SPK and irregular superior/nasal epi. States Celluvisc has helped. Review of Hx shows current problem appears to be recurrent with no relieve from previous Tx. Plan: PLAN: Discussed findings. Rec cont Celluvisc 4X or more. AT [facility] for corneal consult with Dr Jose Enrique Smith.

## 2022-04-25 NOTE — IMPRESSION/PLAN
Impression: Other specified glaucoma: H40.89. IOP 16, 23 today vs OD 19, OS 36. 3/22 PT HAS POAG OS
PT IS UNDER CARE OF DR Lamar Reyes, PT DEFERRED TRABECULECTOMY OS X 2 PT DENIES FAMILY HX OF GLAUCOMA
PT DENIES SULFA ALLERGY 
PT DENIES LUNG DZ 
TARGET IOP MID TEENS OR LESS Plan: PLAN: Discussed findings. 1. CONTINUE BRIMONIDINE TID OS, Dorzolimide/timolol  BID OS.  RTC as sched w Dr Vincenzo Foreman

## 2022-04-25 NOTE — IMPRESSION/PLAN
Impression: Tear film insufficiency of bilateral lacrimal glands: H04.123. Patient with symptomatic DED. Clinical evaluation shows moderate DED clinical signs Plan: PLAN: Lipid based tears to be used 3-4 X daily. Cont Celluvisc QID OS. Rec. 2000 mg Triglyceride based fish oil (Example: Coromega, PRN) to be taken daily. RTC PRN.

## 2022-05-09 ENCOUNTER — OFFICE VISIT (OUTPATIENT)
Dept: URBAN - METROPOLITAN AREA CLINIC 44 | Facility: CLINIC | Age: 72
End: 2022-05-09
Payer: COMMERCIAL

## 2022-05-09 DIAGNOSIS — H04.123 DRY EYE SYNDROME OF BILATERAL LACRIMAL GLANDS: ICD-10-CM

## 2022-05-09 DIAGNOSIS — H40.89 OTHER SPECIFIED GLAUCOMA: Primary | ICD-10-CM

## 2022-05-09 PROCEDURE — 92014 COMPRE OPH EXAM EST PT 1/>: CPT | Performed by: OPHTHALMOLOGY

## 2022-05-09 RX ORDER — FLUOROMETHOLONE 1 MG/ML
0.1 % SUSPENSION/ DROPS OPHTHALMIC
Qty: 5 | Refills: 0 | Status: INACTIVE
Start: 2022-05-09 | End: 2022-05-25

## 2022-05-09 RX ORDER — TAFLUPROST 0 MG/.3ML
0.0015 % SOLUTION/ DROPS OPHTHALMIC
Qty: 0 | Refills: 0 | Status: INACTIVE
Start: 2022-05-09 | End: 2022-05-10

## 2022-05-09 RX ORDER — DORZOLAMIDE HYDROCHLORIDE AND TIMOLOL MALEATE 20; 5 MG/ML; MG/ML
SOLUTION/ DROPS OPHTHALMIC
Qty: 120 | Refills: 5 | Status: INACTIVE
Start: 2022-05-09 | End: 2022-05-12

## 2022-05-09 ASSESSMENT — INTRAOCULAR PRESSURE
OS: 38
OD: 21

## 2022-05-09 NOTE — IMPRESSION/PLAN
Impression: Dry eye syndrome of bilateral lacrimal glands: H04.123. Plan: Patient to use AFT OU for dryness and irritation.

## 2022-05-09 NOTE — IMPRESSION/PLAN
Impression: Other specified glaucoma -POAG indet od, mixed mech os (CACG, ster resp) Plan: Patient was advised to have glaucoma surgery 8 months ago but never followed up or saw a another glaucoma doctor. IOP remains high OS and VF has worsened, and conjunctiva is very very irritated. There is too much inflammation to determine whether to go forward with a Trabeculectomy or a Tube Shunt. Recommend bringing inflammation down before deciding which surgery to continue forward with. Patient to STOP Cosopt and Brimonidine and START FML QID OS, PF Cosopt BID OS, and Zioptan QHS OS. Long discussion with patient and family member about compliance. 

RTC in 2 weeks for IOP check

## 2022-05-23 ENCOUNTER — OFFICE VISIT (OUTPATIENT)
Dept: URBAN - METROPOLITAN AREA CLINIC 44 | Facility: CLINIC | Age: 72
End: 2022-05-23
Payer: COMMERCIAL

## 2022-05-23 PROCEDURE — 99214 OFFICE O/P EST MOD 30 MIN: CPT | Performed by: OPHTHALMOLOGY

## 2022-05-23 RX ORDER — ACETAZOLAMIDE 250 MG/1
250 MG TABLET ORAL
Qty: 90 | Refills: 1 | Status: ACTIVE
Start: 2022-05-23

## 2022-05-23 RX ORDER — OFLOXACIN 3 MG/ML
0.3 % SOLUTION/ DROPS OPHTHALMIC
Qty: 5 | Refills: 1 | Status: ACTIVE
Start: 2022-05-23

## 2022-05-23 RX ORDER — PREDNISOLONE ACETATE 10 MG/ML
1 % SUSPENSION/ DROPS OPHTHALMIC
Qty: 10 | Refills: 1 | Status: ACTIVE
Start: 2022-05-23

## 2022-05-23 ASSESSMENT — INTRAOCULAR PRESSURE
OS: 35
OD: 22

## 2022-05-23 NOTE — IMPRESSION/PLAN
Impression: Other specified glaucoma -POAG indet od, mixed mech os (CACG, ster resp) Plan: **Patient's insurance was unable to cover PF drops that were sent in. IOP elevated and inflammation still present; rec Tube Shunt to help lower IOP

**LONG DISCUSSION - Glaucoma Tube Shunt is recommended. Risks, benefits, alternatives to surgery discussed including pain, bleeding, loss of vision, loss of eye, need for further surgery, double vision, retinal problems, infection, and inflammation. Although surgery is expected to improve the condition, the patient understands it is possible that the condition could worsen in the future. The surgery is being done in an attempt to preserve the current level of vision; vision improvement is NOT expected. Glasses will be necessary for sharpest vision after surgery. Patient advised that ongoing uncontrolled glaucoma may result in permanent vision loss. Discussed in detail the commitment of post operative care, patient may need to travel to different offices. Discussed activity restrictions: no bending head below waist, rubbing the eye, straining or lifting over 10 lbs, stay out of michael, dirty areas and shield when sleeping until advised. All questions answered.

## 2022-05-23 NOTE — IMPRESSION/PLAN
Impression: Other specified glaucoma: H40.89.
2 Plan: The patient wishes to proceed with surgery. See [address] to schedule Baerveldt tube shunt in the  LEFT eye on June 6th per Dr Bashir Garnett, H&P, AScan [if required],  POD1, POW1, POW3 and POW5.5-6.5.
--***Clearance not needed 
- Educational material provided. - ERx'd Ofloxacin QID OS, and Prednisolone Acetate QID OS as requested. - Continue Glaucoma Drops: PF Cosopt BID OS, PF Zioptan QHS OU, FML QID OS, and Diamox 250 mg BID - r/b/a's were explained about pill. - Patient will continue glaucoma drops as directed until surgery. After surgery, patient will stop glaucoma drops ONLY in the surgical eye. Start surgical drops when eye patch is removed 5hrs after surgery. **Patient brought in paper work for authorization for her insurance and re sent in PF cosopt & PF zioptan, in the meantime patient was given samples.

## 2022-05-25 ENCOUNTER — ADULT PHYSICAL (OUTPATIENT)
Dept: URBAN - METROPOLITAN AREA CLINIC 44 | Facility: CLINIC | Age: 72
End: 2022-05-25
Payer: COMMERCIAL

## 2022-05-25 DIAGNOSIS — H40.052 OCULAR HYPERTENSION, LEFT EYE: ICD-10-CM

## 2022-05-25 DIAGNOSIS — Z01.818 ENCOUNTER FOR OTHER PREPROCEDURAL EXAMINATION: Primary | ICD-10-CM

## 2022-05-25 PROCEDURE — 99213 OFFICE O/P EST LOW 20 MIN: CPT | Performed by: INTERNAL MEDICINE

## 2022-06-06 ENCOUNTER — SURGERY (OUTPATIENT)
Dept: URBAN - METROPOLITAN AREA SURGERY 19 | Facility: SURGERY | Age: 72
End: 2022-06-06
Payer: COMMERCIAL

## 2022-06-06 PROCEDURE — 66180 AQUEOUS SHUNT EYE W/GRAFT: CPT | Performed by: OPHTHALMOLOGY

## 2022-06-07 ENCOUNTER — POST-OPERATIVE VISIT (OUTPATIENT)
Dept: URBAN - METROPOLITAN AREA CLINIC 56 | Facility: CLINIC | Age: 72
End: 2022-06-07
Payer: COMMERCIAL

## 2022-06-07 DIAGNOSIS — Z48.810 ENCOUNTER FOR SURGICAL AFTERCARE FOLLOWING SURGERY ON A SENSE ORGAN: Primary | ICD-10-CM

## 2022-06-07 PROCEDURE — 99024 POSTOP FOLLOW-UP VISIT: CPT | Performed by: OPHTHALMOLOGY

## 2022-06-07 ASSESSMENT — INTRAOCULAR PRESSURE
OD: 19
OS: 23

## 2022-06-07 NOTE — IMPRESSION/PLAN
Impression:  Encounter for surgical aftercare following surgery on a sense organ  Z48.810. Plan: - Doing well though with some inflammation, should be improved by starting postop drops. - Drop Instructions: Stop all glaucoma eye drops in the operative eye. Start Prednisolone Acetate QID OS and Ofloxacin QID OS. Patient to STOP taking FML and PF Cosopt for now, but continue taking Zioptan QHS OD.
- Discussed surgery in detail with patient. Post-Operative instructions reviewed with patient. Discussed activity restrictions including no bending head below waist, rubbing the eye, straining or lifting over 10 lbs. , stay out of michael, dirty areas and shield at night continue until further notice. Patient advised to call with any RSVP (redness, sensitivity to light, vision change, pain worse than today), call 24/7.
- RTC: as scheduled.

## 2022-06-14 ENCOUNTER — POST-OPERATIVE VISIT (OUTPATIENT)
Dept: URBAN - METROPOLITAN AREA CLINIC 44 | Facility: CLINIC | Age: 72
End: 2022-06-14
Payer: COMMERCIAL

## 2022-06-14 DIAGNOSIS — Z48.810 ENCOUNTER FOR SURGICAL AFTERCARE FOLLOWING SURGERY ON A SENSE ORGAN: Primary | ICD-10-CM

## 2022-06-14 PROCEDURE — 99024 POSTOP FOLLOW-UP VISIT: CPT | Performed by: OPHTHALMOLOGY

## 2022-06-14 RX ORDER — DORZOLAMIDE HYDROCHLORIDE AND TIMOLOL MALEATE 20; 5 MG/ML; MG/ML
SOLUTION/ DROPS OPHTHALMIC
Qty: 10 | Refills: 5 | Status: ACTIVE
Start: 2022-06-14

## 2022-06-14 ASSESSMENT — INTRAOCULAR PRESSURE
OS: 29
OD: 16

## 2022-06-14 NOTE — IMPRESSION/PLAN
Impression:  Encounter for surgical aftercare following surgery on a sense organ  Z48.810. Plan: - IOP is elevated OS, which is expected. Inflammation in [facility] has improved. - Drop Instructions: Stop all glaucoma eye drops in the operative eye. Continue Prednisolone Acetate QID OS for 1 week and the drop to TID. STOP Ofloxacin QID OS after 1 week. Having issues with insurance covering PF drops; patient to STOP PF Cosopt and switch to Cosopt BID OU, CHANGE Zioptan QHS OD to OU.
- Discussed surgery in detail with patient. Post-Operative instructions reviewed with patient. Discussed activity restrictions including no bending head below waist, rubbing the eye, straining or lifting over 10 lbs. , stay out of michael, dirty areas and shield at night continue until further notice. Patient advised to call with any RSVP (redness, sensitivity to light, vision change, pain worse than today), call 24/7.
- RTC: as scheduled.

## 2022-06-30 ENCOUNTER — POST-OPERATIVE VISIT (OUTPATIENT)
Dept: URBAN - METROPOLITAN AREA CLINIC 44 | Facility: CLINIC | Age: 72
End: 2022-06-30
Payer: COMMERCIAL

## 2022-06-30 DIAGNOSIS — Z48.810 ENCOUNTER FOR SURGICAL AFTERCARE FOLLOWING SURGERY ON A SENSE ORGAN: Primary | ICD-10-CM

## 2022-06-30 PROCEDURE — 99024 POSTOP FOLLOW-UP VISIT: CPT | Performed by: OPHTHALMOLOGY

## 2022-06-30 RX ORDER — PREDNISOLONE ACETATE 10 MG/ML
1 % SUSPENSION/ DROPS OPHTHALMIC
Qty: 10 | Refills: 1 | Status: ACTIVE
Start: 2022-06-30

## 2022-06-30 RX ORDER — PREDNISOLONE ACETATE 10 MG/ML
1 % SUSPENSION/ DROPS OPHTHALMIC
Qty: 10 | Refills: 1 | Status: INACTIVE
Start: 2022-06-30 | End: 2022-06-30

## 2022-06-30 RX ORDER — DORZOLAMIDE HYDROCHLORIDE AND TIMOLOL MALEATE 20; 5 MG/ML; MG/ML
SOLUTION/ DROPS OPHTHALMIC
Qty: 10 | Refills: 5 | Status: INACTIVE
Start: 2022-06-30 | End: 2022-06-30

## 2022-06-30 RX ORDER — TAFLUPROST 0 MG/.3ML
0.0015 % SOLUTION/ DROPS OPHTHALMIC
Qty: 180 | Refills: 1 | Status: ACTIVE
Start: 2022-06-30

## 2022-06-30 RX ORDER — DORZOLAMIDE HYDROCHLORIDE AND TIMOLOL MALEATE 20; 5 MG/ML; MG/ML
SOLUTION/ DROPS OPHTHALMIC
Qty: 360 | Refills: 1 | Status: ACTIVE
Start: 2022-06-30

## 2022-06-30 ASSESSMENT — INTRAOCULAR PRESSURE
OD: 18
OS: 24

## 2022-06-30 NOTE — IMPRESSION/PLAN
Impression: S/P Shunt:  Baerveldt with Tutoplast scleral Reinforcement OS - 24 Days. Encounter for surgical aftercare following surgery on a sense organ  Z48.810. Plan: IOP is still higher OU. Inflammation OS is worse today as patient misunderstood steroid instructions and was using Pred 6x/day then switched to 3x/day, and did not use glaucoma drops in OD. Drop instructions: INCREASE and TAPER Prednisolone OS 5x/day x1 week, QID x1 week, TID x1 week, BID x1 week, QD x1 week, then stop, Cosopt BID OU, and Zioptan QHS OU. Drop instruction sheet given, and explained to patient in detail. Emphasized compliance; patient to use drops as directed, EVEN on days of appointments. Poor compliance can lead to blindness. - Activity restrictions still in place until further notice. Patient advised to call with any RSVP (redness, sensitivity to light, vision change, pain worse than today), call 24/7.
- RTC: as scheduled.

## 2022-07-14 ENCOUNTER — POST-OPERATIVE VISIT (OUTPATIENT)
Dept: URBAN - METROPOLITAN AREA CLINIC 44 | Facility: CLINIC | Age: 72
End: 2022-07-14
Payer: COMMERCIAL

## 2022-07-14 DIAGNOSIS — Z48.810 ENCOUNTER FOR SURGICAL AFTERCARE FOLLOWING SURGERY ON A SENSE ORGAN: Primary | ICD-10-CM

## 2022-07-14 PROCEDURE — 99024 POSTOP FOLLOW-UP VISIT: CPT | Performed by: OPHTHALMOLOGY

## 2022-07-14 RX ORDER — DUREZOL 0.5 MG/ML
0.05 % EMULSION OPHTHALMIC
Qty: 10 | Refills: 2 | Status: ACTIVE
Start: 2022-07-14

## 2022-07-14 ASSESSMENT — INTRAOCULAR PRESSURE
OS: 17
OD: 17

## 2022-07-14 NOTE — IMPRESSION/PLAN
Impression: S/P Shunt:  Baerveldt with Tutoplast scleral Reinforcement OS - 38 Days. Encounter for surgical aftercare following surgery on a sense organ  Z48.810. Plan: Tube is open, IOP is better today. Inflammation is worse today, will change steroid drop. Drop instructions: STOP Prednisolone, START Durezol QID OS, CONTINUE Cosopt BID OU, and Zioptan QHS OU. Drop instruction sheet given, and explained to patient in detail. Emphasized compliance; patient to use drops as directed, EVEN on days of appointments. Poor compliance can lead to blindness. - Activity restrictions still in place until further notice.   Patient advised to call with any RSVP (redness, sensitivity to light, vision change, pain worse than today), call 24/7.
- RTC: as scheduled with Dr. Mya Snell, also 2 weeks for PO with Dr. Michele Patel

## 2022-07-20 ENCOUNTER — OFFICE VISIT (OUTPATIENT)
Dept: URBAN - METROPOLITAN AREA CLINIC 44 | Facility: CLINIC | Age: 72
End: 2022-07-20
Payer: COMMERCIAL

## 2022-07-20 DIAGNOSIS — H20.042 SECONDARY NONINFECTIOUS IRIDOCYCLITIS, LEFT EYE: Primary | ICD-10-CM

## 2022-07-20 DIAGNOSIS — H18.232 SECONDARY CORNEAL EDEMA, LEFT EYE: ICD-10-CM

## 2022-07-20 DIAGNOSIS — Z48.810 ENCOUNTER FOR SURGICAL AFTERCARE FOLLOWING SURGERY ON A SENSE ORGAN: ICD-10-CM

## 2022-07-20 PROCEDURE — 76514 ECHO EXAM OF EYE THICKNESS: CPT | Performed by: OPHTHALMOLOGY

## 2022-07-20 PROCEDURE — 92025 CPTRIZED CORNEAL TOPOGRAPHY: CPT | Performed by: OPHTHALMOLOGY

## 2022-07-20 PROCEDURE — 92014 COMPRE OPH EXAM EST PT 1/>: CPT | Performed by: OPHTHALMOLOGY

## 2022-07-20 ASSESSMENT — INTRAOCULAR PRESSURE
OD: 14
OS: 15

## 2022-07-20 NOTE — IMPRESSION/PLAN
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 Plan: Dense fibrin in AC, doesn't appear infectious given how quiet the eye is otherwise. Agree with plan to switch to Durezol/difluprednate; pt has not been able to receive med as insurance did not approve,  We will work on PA STAT and have pt start this medication. NO IMPROVEMENT ON PREDNISOLONE; WILL NEED STRONGER STEROID LIKE DUREZOL OR DIFLUPREDNATE; FML OR LOTEMAX NOT STRONG ENOUGH - will send message to insurance. If no improvement, will consider A/C washout with tap and inject.

## 2022-07-20 NOTE — IMPRESSION/PLAN
Impression: S/P Shunt:  Baerveldt with Tutoplast scleral Reinforcement OS. Encounter for surgical aftercare following surgery on a sense organ  Z48.810.  Plan: Under the care of Dr. Wild Reyes

## 2022-07-20 NOTE — IMPRESSION/PLAN
Impression: Secondary corneal edema, left eye: J87.836. Plan: Corneal edema is likely 2/2 inflammation; will observe for now; if no improvement pt understands may need an EK in the future.

## 2022-07-28 ENCOUNTER — POST-OPERATIVE VISIT (OUTPATIENT)
Dept: URBAN - METROPOLITAN AREA CLINIC 44 | Facility: CLINIC | Age: 72
End: 2022-07-28
Payer: COMMERCIAL

## 2022-07-28 DIAGNOSIS — Z48.810 ENCOUNTER FOR SURGICAL AFTERCARE FOLLOWING SURGERY ON A SENSE ORGAN: Primary | ICD-10-CM

## 2022-07-28 PROCEDURE — 99024 POSTOP FOLLOW-UP VISIT: CPT | Performed by: OPHTHALMOLOGY

## 2022-07-28 RX ORDER — PREDNISOLONE ACETATE 10 MG/ML
1 % SUSPENSION/ DROPS OPHTHALMIC
Qty: 10 | Refills: 1 | Status: ACTIVE
Start: 2022-07-28

## 2022-07-28 ASSESSMENT — INTRAOCULAR PRESSURE
OD: 20
OS: 16

## 2022-07-28 NOTE — IMPRESSION/PLAN
Impression: S/P Shunt:  Baerveldt with Tutoplast scleral Reinforcement OS - 52 Days. Encounter for surgical aftercare following surgery on a sense organ  Z48.810. Post operative instructions reviewed - Plan: IOP is holding OS. Inflammation is much worse today. Patient states taking Prednisolone BID OS(unable to obtain Durezol), but needs more for inflammation to decrease and prevent scarring. Patient previously having trouble with insurance covering Durezol, but states insurance agree to cover drops, however, patient's pharmacy is having supply issues. Offered to try different pharmacy, but patient would like to try calling again later today. If pharmacy does not have drop by end of today, then patient to call office and [address] on file. Drop instructions: Patient to RESTART Prednisolone 6x daily OS until patient is able to receive Durezol(TAKE QID OS WHEN OBTAINED) and CONTINUE Cosopt BID OU, and Zioptan QHS OU. Drop instruction sheet given, and explained to patient in detail. Emphasized compliance; patient to use drops as directed, EVEN on days of appointments. Poor compliance can lead to blindness. - Activity restrictions still in place until further notice.   Patient advised to call with any RSVP (redness, sensitivity to light, vision change, pain worse than today), call 24/7.
- RTC: in 2 weeks

## 2022-08-08 ENCOUNTER — POST-OPERATIVE VISIT (OUTPATIENT)
Dept: URBAN - METROPOLITAN AREA CLINIC 44 | Facility: CLINIC | Age: 72
End: 2022-08-08
Payer: COMMERCIAL

## 2022-08-08 DIAGNOSIS — H20.042 SECONDARY NONINFECTIOUS IRIDOCYCLITIS, LEFT EYE: Primary | ICD-10-CM

## 2022-08-08 DIAGNOSIS — H18.232 SECONDARY CORNEAL EDEMA, LEFT EYE: ICD-10-CM

## 2022-08-08 DIAGNOSIS — Z48.810 ENCOUNTER FOR SURGICAL AFTERCARE FOLLOWING SURGERY ON A SENSE ORGAN: ICD-10-CM

## 2022-08-08 PROCEDURE — 92012 INTRM OPH EXAM EST PATIENT: CPT | Performed by: OPHTHALMOLOGY

## 2022-08-08 PROCEDURE — 99024 POSTOP FOLLOW-UP VISIT: CPT | Performed by: OPHTHALMOLOGY

## 2022-08-08 ASSESSMENT — INTRAOCULAR PRESSURE
OS: 14
OS: 11
OD: 14
OS: 11
OD: 14
OD: 13
OS: 14
OD: 13

## 2022-08-08 NOTE — IMPRESSION/PLAN
Impression: Secondary corneal edema, left eye: X85.115.  Plan: Corneal edema is likely 2/2 inflammation, slight improvement today; will cont Durezol as in above

## 2022-08-08 NOTE — IMPRESSION/PLAN
Impression: S/P Shunt:  Baerveldt with Tutoplast scleral Reinforcement OS. Encounter for surgical aftercare following surgery on a sense organ  Z48.810.  Plan: Under the care of Dr. Linda Gomez

## 2022-08-08 NOTE — IMPRESSION/PLAN
Impression: S/P Shunt:  Baerveldt with Tutoplast scleral Reinforcement OS - 63 Days. Encounter for surgical aftercare following surgery on a sense organ  Z48.810. Plan: IOP is stable OU, but pt has not been using drops OD x 4 days. Stressed compliance with all drops. Fibrin present OS is persistent, likely because steroids were not correctly taken for many weeks. Will try to melt with consistent Durezol but may ultimately need surgical evacuation. CONTINUE Diflurepred QID OS, PF Cosopt BID OU, and Zioptan QHS OU. Drop instruction sheet given, and explained to patient in detail. Emphasized compliance; patient to use drops as directed, EVEN on days of appointments. Poor compliance can lead to blindness. - Activity restrictions still in place until further notice.   Patient advised
rtc in 2-3 weeks

## 2022-08-08 NOTE — IMPRESSION/PLAN
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 Plan: Improving. 
Cont Durezol/difluprednate QID, IOP holding

## 2022-08-30 ENCOUNTER — OFFICE VISIT (OUTPATIENT)
Dept: URBAN - METROPOLITAN AREA CLINIC 44 | Facility: CLINIC | Age: 72
End: 2022-08-30
Payer: COMMERCIAL

## 2022-08-30 DIAGNOSIS — H18.232 SECONDARY CORNEAL EDEMA, LEFT EYE: Primary | ICD-10-CM

## 2022-08-30 PROCEDURE — 92012 INTRM OPH EXAM EST PATIENT: CPT | Performed by: OPHTHALMOLOGY

## 2022-08-30 RX ORDER — TAFLUPROST 0 MG/.3ML
0.0015 % SOLUTION/ DROPS OPHTHALMIC
Qty: 180 | Refills: 1 | Status: ACTIVE
Start: 2022-08-30

## 2022-08-30 ASSESSMENT — INTRAOCULAR PRESSURE
OD: 12
OS: 13

## 2022-08-30 NOTE — IMPRESSION/PLAN
Impression: Secondary corneal edema, left eye: F88.166. Plan: Corneal edema continues to improve. Cont Durezol QID, taper per Dr. Jasper Yuan. Is persistent or worse, will plan for EK in the future.

## 2022-09-01 ENCOUNTER — POST-OPERATIVE VISIT (OUTPATIENT)
Dept: URBAN - METROPOLITAN AREA CLINIC 44 | Facility: CLINIC | Age: 72
End: 2022-09-01
Payer: COMMERCIAL

## 2022-09-01 DIAGNOSIS — Z48.810 ENCOUNTER FOR SURGICAL AFTERCARE FOLLOWING SURGERY ON A SENSE ORGAN: Primary | ICD-10-CM

## 2022-09-01 PROCEDURE — 99024 POSTOP FOLLOW-UP VISIT: CPT | Performed by: OPHTHALMOLOGY

## 2022-09-01 ASSESSMENT — INTRAOCULAR PRESSURE
OD: 12
OS: 11

## 2022-09-01 NOTE — IMPRESSION/PLAN
Impression:  Encounter for surgical aftercare following surgery on a sense organ  Z48.810. Plan: IOP is still stable OU. Fibrin present OS is not as dense today as she has been taking Durezol regularly. Will continue to try to melt with consistent Durezol but may ultimately need surgical removal of membrane. CONTINUE Diflurepred QID OS, PF Cosopt BID OU, and Zioptan QHS OU. Emphasized compliance; patient to use drops as directed, EVEN on days of appointments. Poor compliance can lead to blindness. - Activity restrictions are lifted. 
RTC in 3-4 weeks with Dr. Natali Ralph or Dr. Claire Soares

## 2022-10-04 ENCOUNTER — OFFICE VISIT (OUTPATIENT)
Facility: LOCATION | Age: 72
End: 2022-10-04
Payer: COMMERCIAL

## 2022-10-04 DIAGNOSIS — H40.89 OTHER SPECIFIED GLAUCOMA: Primary | ICD-10-CM

## 2022-10-04 PROCEDURE — 99213 OFFICE O/P EST LOW 20 MIN: CPT | Performed by: STUDENT IN AN ORGANIZED HEALTH CARE EDUCATION/TRAINING PROGRAM

## 2022-10-04 RX ORDER — TAFLUPROST 0 MG/.3ML
0.0015 % SOLUTION/ DROPS OPHTHALMIC
Qty: 180 | Refills: 1 | Status: ACTIVE
Start: 2022-10-04

## 2022-10-04 RX ORDER — DIFLUPREDNATE OPHTHALMIC 0.5 MG/ML
0.05 % EMULSION OPHTHALMIC
Qty: 10 | Refills: 2 | Status: ACTIVE
Start: 2022-10-04

## 2022-10-04 RX ORDER — DIFLUPREDNATE OPHTHALMIC 0.5 MG/ML
0.05 % EMULSION OPHTHALMIC
Qty: 10 | Refills: 2 | Status: INACTIVE
Start: 2022-10-04 | End: 2022-10-04

## 2022-10-04 ASSESSMENT — INTRAOCULAR PRESSURE
OD: 15
OS: 14

## 2022-10-04 NOTE — IMPRESSION/PLAN
Impression: Other specified glaucoma -POAG indet od, mixed mech os (CACG, ster resp) Plan: Ocular Surgical History: s/p baerveldt os Pachy:  Tmax: 27/49 Target IOP: low to mid teens OU Med Allergies: none Heart / Lung / Kidney disease/sulfa allergy: Pt. denies Gonioscopy: 
OCT: 82/80 inf thinning HVF: OD: MD -1 grossly full / OS: MD -13 SI arc
C/D: .65/.75 Better seeing eye:  OD
IOP Today: 15/14 Plan: IOP today is slightly higher today, but still doing well. Patient is still on Durezol due to inflammation. Explained that she may need AC washout to removed fibrin web. Drops:  Continue:  PF Cosopt BID OU, Zioptan QHS OU, and Durezol QID OS Discussed natural history of glaucoma and that irreversible vision loss can occur without adequate IOP control. Emphasized compliance with eyedrops and other treatment described above.

## 2022-11-09 ENCOUNTER — OFFICE VISIT (OUTPATIENT)
Facility: LOCATION | Age: 72
End: 2022-11-09
Payer: COMMERCIAL

## 2022-11-09 DIAGNOSIS — H18.232 SECONDARY CORNEAL EDEMA, LEFT EYE: ICD-10-CM

## 2022-11-09 DIAGNOSIS — H40.89 OTHER SPECIFIED GLAUCOMA: Primary | ICD-10-CM

## 2022-11-09 DIAGNOSIS — H20.042 SECONDARY NONINFECTIOUS IRIDOCYCLITIS, LEFT EYE: ICD-10-CM

## 2022-11-09 PROCEDURE — 92014 COMPRE OPH EXAM EST PT 1/>: CPT | Performed by: STUDENT IN AN ORGANIZED HEALTH CARE EDUCATION/TRAINING PROGRAM

## 2022-11-09 ASSESSMENT — INTRAOCULAR PRESSURE
OD: 13
OS: 14

## 2022-11-09 NOTE — IMPRESSION/PLAN
Impression: Other specified glaucoma POAG Mild  OD
- mixed mech Moderate OS (CACG, OAG, ster resp) Plan: Ocular Surgical History: s/p baerveldt os Pachy:  Tmax: 27/49 Target IOP: low to mid teens OU Med Allergies: none Heart / Lung / Kidney disease/sulfa allergy: Pt. denies Gonioscopy: 
OCT: 82/80 inf thinning HVF: OD: MD -1 grossly full / OS: MD -13 SI arc
C/D: .65/.75 Better seeing eye:  OD
IOP Today: 13/14 Plan: IOP today is at target, inflammation is worse today. Explained to patient that she needs an AC washout to removed fibrin web. Drops: CONTINUE  PF Cosopt BID OU, Zioptan QHS OU, and Durezol QID OS Discussed natural history of glaucoma and that irreversible vision loss can occur without adequate IOP control. Emphasized compliance with eyedrops and other treatment described above.

## 2022-11-09 NOTE — IMPRESSION/PLAN
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 David Hinton.

## 2022-11-23 ENCOUNTER — OFFICE VISIT (OUTPATIENT)
Dept: URBAN - METROPOLITAN AREA CLINIC 44 | Facility: CLINIC | Age: 72
End: 2022-11-23
Payer: COMMERCIAL

## 2022-11-23 DIAGNOSIS — H18.232 SECONDARY CORNEAL EDEMA, LEFT EYE: Primary | ICD-10-CM

## 2022-11-23 DIAGNOSIS — H40.89 OTHER SPECIFIED GLAUCOMA: ICD-10-CM

## 2022-11-23 DIAGNOSIS — H35.352 CYSTOID MACULAR DEGENERATION, LEFT EYE: ICD-10-CM

## 2022-11-23 DIAGNOSIS — H20.042 SECONDARY NONINFECTIOUS IRIDOCYCLITIS, LEFT EYE: ICD-10-CM

## 2022-11-23 PROCEDURE — 92012 INTRM OPH EXAM EST PATIENT: CPT | Performed by: OPHTHALMOLOGY

## 2022-11-23 ASSESSMENT — INTRAOCULAR PRESSURE
OD: 16
OS: 14

## 2022-11-23 NOTE — IMPRESSION/PLAN
Impression: Other specified glaucoma POAG Mild  OD
- mixed mech Moderate OS (CACG, OAG, ster resp) Plan: Under the care of Dr. Roseline Mason. 
Drops: CONTINUE  PF Cosopt BID OU, Zioptan QHS OU, and Durezol QID OS

## 2022-11-23 NOTE — IMPRESSION/PLAN
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

## 2022-11-23 NOTE — IMPRESSION/PLAN
Impression: Cystoid macular degeneration, left eye: H35.352. Plan: s/p IVTA 8/2021, s/p Ozurdex OS 12/2021. Pt requests to go back to Dr. Miranda Jiang, will schedule appt.

## 2022-11-23 NOTE — IMPRESSION/PLAN
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 Plan: Stable. Discussed DSAEK surgery, a version of endothelial keratoplasty replacing endothelial cells with descemet's membrane. The rejection rate with this procedure is lower versus the previous corneal transplants. The visual recovery is faster but may still take weeks to months. In patients with a history of glaucoma, retinal problems, prior vitrectomies, or positioning problems, I recommend DSAEK. Risks of DSAEK include similar risks to any intraocular surgery such as bleeding and infection. Risks include rejection, need for additional surgery, need for glasses after, and the risks from the medications used. Steroids should not be stopped without instruction by a doctor. Information packet given. Questions answered. Would plan for [facility] washout/paracentesis with culture, iris repair with sutures. Would need retina clearance before any surgery Pt would like to defer surgery at this point. 
May send back to cornea clinic if pt desires surgery

## 2023-01-12 ENCOUNTER — OFFICE VISIT (OUTPATIENT)
Dept: URBAN - METROPOLITAN AREA CLINIC 44 | Facility: CLINIC | Age: 73
End: 2023-01-12
Payer: COMMERCIAL

## 2023-01-12 DIAGNOSIS — H40.89 OTHER SPECIFIED GLAUCOMA: ICD-10-CM

## 2023-01-12 DIAGNOSIS — H18.232 SECONDARY CORNEAL EDEMA, LEFT EYE: ICD-10-CM

## 2023-01-12 DIAGNOSIS — H20.12 CHRONIC IRIDOCYCLITIS, LEFT EYE: Primary | ICD-10-CM

## 2023-01-12 DIAGNOSIS — H35.352 CYSTOID MACULAR DEGENERATION, LEFT EYE: ICD-10-CM

## 2023-01-12 PROCEDURE — 92014 COMPRE OPH EXAM EST PT 1/>: CPT | Performed by: OPHTHALMOLOGY

## 2023-01-12 ASSESSMENT — INTRAOCULAR PRESSURE
OD: 18
OS: 10

## 2023-01-12 NOTE — IMPRESSION/PLAN
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 Plan: patient lost to f/u after sending to glaucoma, complicated postop course with extensive fibrin formation and subsequent corneal decompensation. s/p IVTA 8/2021, s/p Ozurdex OS 12/2021. Likely has significant underlying CME, will need treatment but will need to coordinate with cornea to determine best course of action. Will obtain [facility] authorization for treatment.

## 2023-01-12 NOTE — IMPRESSION/PLAN
"Subjective   History of Present Illness    Chief Complaint: Reported altered mental status and fall  History of Present Illness: 79-year-old female for story in reported 2 falls in the last 2days with general weakness.  History of COPD, chronic anticoagulation, SVT, MS  Onset: 2 days  Duration: Persistent  Exacerbating / Alleviating factors: None  Associated symptoms: None      Nurses Notes reviewed and agree, including vitals, allergies, social history and prior medical history.     REVIEW OF SYSTEMS: All systems reviewed and not pertinent unless noted.    Positive for: Reported altered mental status and falls    Negative for: Fever vomiting sick contacts travel abdominal pain chest pain shortness of breath  Review of Systems    Past Medical History:   Diagnosis Date   • Body piercing     EARS   • Constipation    • COPD (chronic obstructive pulmonary disease) (Formerly [facility])    • Cough     NOTED WHILE PATIENT WAS IN PREADMISSION TESTING. PATIENT REPORTS \"CLEAR PHLEM\" WITH NO FEVER AND STATED \"IT'S JUST A SMOKERS COUGH\"   • Full dentures     INSTRUCTED NO ADHESIVES THE DOS   • GERD (gastroesophageal reflux disease)     REPORTS ONLY INTERMITTENT EPISODES   • Hearing loss     PATIENT REPORTS AGE RELATED. NO USE OF HEARING AIDS.    • History of fracture     REPORTS HISTORY OF FRACTURED PELVIS.  REPORTS FALL EARLY JAN 2019 RESULTING IN FRACTURED LEFT SHOULDER.   • History of pneumonia     REPORTS MULTIPLE TIMES   • History of transfusion     REPORTS AS A TEEN AND THAT SHE DID NOT HAVE ANY REACTIONS TO TRANSFUSION   • Hypotension     REPORTS \"MY BLOOD PRESSURE IS ALWAYS LOW - I HAVE NEVER BEEN ABLE TO DONATE BLOOD\"   • Impaired functional mobility, balance, gait, and endurance 09/13/2021   • Impaired mobility     RELATED TO MULTIPLE SCLEROSIS   • MS (multiple sclerosis) (Formerly [facility])    • Tattoo    • Wears glasses        No Known Allergies    Past Surgical History:   Procedure Laterality Date   • APPENDECTOMY      REPORTS SHE THINKS SHE " Impression: Other specified glaucoma POAG Mild  OD
- mixed mech Moderate OS (CACG, OAG, ster resp) Plan: Under the care of Dr. José Miguel Valle. 
Drops: CONTINUE  PF Cosopt BID OU, Zioptan QHS OU, and Durezol QID OS HAD APPENDIX REMOVED WHEN GALLBLADDER WAS REMOVED.   • CHOLECYSTECTOMY     • CYBERKNIFE  03/12/2020    left trigeminal nerve   • EYE SURGERY      Corneal implant, PATIENT REPORTS SHE IS UNSURE LATERALITY   • MOUTH SURGERY      FULL MOUTH EXTRACTION   • TOTAL SHOULDER ARTHROPLASTY Left 2/7/2019    Procedure: Left reverse total shoulder arthroplasty;  Surgeon: Eldon Valadez MD;  Location: [facility];  Service: Orthopedics       Family History   Family history unknown: Yes       Social History     Socioeconomic History   • Marital status:    Tobacco Use   • Smoking status: Current Every Day Smoker     Packs/day: 0.50     Years: 58.00     Pack years: 29.00     Types: Cigarettes   • Smokeless tobacco: Never Used   • Tobacco comment: REPORTS SHE HAS SMOKED UP TO 1 PPD IN THE PAST   Substance and Sexual Activity   • Alcohol use: No   • Drug use: No   • Sexual activity: Defer           Objective   Physical Exam   83 % room air     0655 120/107 97.7 °F (36.5 °C) 80 18       CONSTITUTIONAL: Well developed, frail elderly 79-year-old  female,  in no acute distress.  VITAL SIGNS: per nursing, reviewed and noted  SKIN: exposed skin with no rashes, ulcerations or petechiae.  EYES: perrla. EOMI.  ENT: Normal voice.  Moist mucous membranes.  No oropharyngeal injury.  RESPIRATORY:  No increased work of breathing. No retractions. mild wheezes.  Decreased breath sounds throughout.  CARDIOVASCULAR:  regular rate and rhythm, no murmurs.  Good Peripheral pulses. Good cap refill to extremities.   GI: Abdomen soft, nontender, normal bowel sounds. No hernia. No ascites.  MUSCULOSKELETAL:  No tenderness. Full ROM. Strength and tone grossly normal.  no spasms. no neck or back tenderness or spasm.   NEUROLOGIC: Alert, oriented x 3. No gross deficits. GCS 15.  NIH stroke scale 0  PSYCH: Flat affect.  : no bladder tenderness or distention, no CVA tenderness      Procedures     No attending physician procedures were  performed on this patient.      ED Course  ED Course as of 01/03/22 1110   Mon Jan 03, 2022   0752 EKG interpreted by me reveals sinus rhythm at a rate of 80.  Nonspecific T wave changes. [PF]   0911 PROCEDURE: CT HEAD WO CONTRAST-     HISTORY: falls, anticoagulated. reported mental status changes.     TECHNIQUE: Noncontrast exam     FINDINGS:  Moderate atrophy and chronic ischemic white matter changes  are noted.     No cortical edema is present. There is no mass or hemorrhage. Ventricles  are normal.     Bone windows show no skull fracture or obvious destructive lesion.     IMPRESSION:  1. No acute intracranial abnormality or obvious mass.   2. Atrophy and chronic ischemic white matter changes as above.        This study was performed with techniques to keep radiation doses as low  as reasonably achievable (ALARA). Individualized dose reduction  techniques using automated exposure control or adjustment of vA and/or  kV according to the patient size were employed.      This report was finalized on 1/3/2022 9:01 AM by Lisandro Patrick MD.          Specimen Collected: 01/03/22 09:00 Last Resulted: 01/03/22 09:01         [PF]   0911 PROCEDURE: XR CHEST 1 VW-     HISTORY: AMS protocol , smoking history     COMPARISON:  9/10/2021     FINDINGS:  Portable view of the chest demonstrates chronic elevation  left diaphragm. Chronic scarring is noted right lung base. No acute  infiltrate is seen. There is no evidence of effusion, pneumothorax or  other significant pleural disease. The mediastinum is unremarkable.     The heart size is normal.     IMPRESSION:  Chronic changes      This report was finalized on 1/3/2022 9:00 AM by Lisandro Patrick MD. [PF]      ED Course User Index  [PF] Arden Law, DO      Patient arrives hypoxic with sats of 83%.  Normotensive.  Afebrile.  Will add head CT given reported falls and chronic anticoagulation with reported mental status changes.  Concern for pneumonia versus COPD exacerbation given  patient's hypoxia on arrival not requiring home oxygen.  Patient is stable on 4 L nasal cannula with oxygen saturations 96%.  Rule out other infectious causes as well.  Likely admission.  Work-up pending.                                           MDM  CT head without acute findings.  No UTI.  No ACS.  No renal insufficiency.  Chest x-ray without acute findings.  Hypoxic room air sats are 83%.  Discussed with Dr. Castro, will admit for COPD exacerbation general weakness and hypoxia  Final diagnoses:   COPD exacerbation (HCC)   Hypoxia   Fall, initial encounter       ED Disposition  ED Disposition     ED Disposition Condition Comment    Decision to Admit  Level of Care: Telemetry [5]   Diagnosis: COPD exacerbation (HCC) [225722]   Admitting Physician: RADHA CASTRO [[id number]]   Isolate for COVID?: No [0]   Certification: I Certify That Inpatient Hospital Services Are Medically Necessary For Greater Than 2 Midnights            No follow-up provider specified.       Medication List      No changes were made to your prescriptions during this visit.          Arden Law,   01/03/22 1110

## 2023-01-12 NOTE — IMPRESSION/PLAN
Impression: Secondary corneal edema, left eye: H94.950. Plan: previous discussion with Dr Carolyn Kamara about proceeding with DSAEK, at the time patient deferred however fibrin and edema prevents fundus exam. long discussion with patient about tx options, observation vs surgical intervention. disc r/b/a's, pt elects to proceed.  will discuss surgical options with Dr Carolyn Kamara

## 2023-02-08 ENCOUNTER — OFFICE VISIT (OUTPATIENT)
Dept: URBAN - METROPOLITAN AREA CLINIC 10 | Facility: CLINIC | Age: 73
End: 2023-02-08
Payer: COMMERCIAL

## 2023-02-08 DIAGNOSIS — H20.12 CHRONIC IRIDOCYCLITIS, LEFT EYE: ICD-10-CM

## 2023-02-08 DIAGNOSIS — H35.352 CYSTOID MACULAR DEGENERATION, LEFT EYE: ICD-10-CM

## 2023-02-08 DIAGNOSIS — H40.89 OTHER SPECIFIED GLAUCOMA: ICD-10-CM

## 2023-02-08 DIAGNOSIS — H18.232 SECONDARY CORNEAL EDEMA, LEFT EYE: Primary | ICD-10-CM

## 2023-02-08 PROCEDURE — 92014 COMPRE OPH EXAM EST PT 1/>: CPT | Performed by: OPHTHALMOLOGY

## 2023-02-08 PROCEDURE — 76514 ECHO EXAM OF EYE THICKNESS: CPT | Performed by: OPHTHALMOLOGY

## 2023-02-08 ASSESSMENT — VISUAL ACUITY
OS: HM
OD: 20/25

## 2023-02-08 ASSESSMENT — INTRAOCULAR PRESSURE
OD: 14
OS: 6

## 2023-02-09 NOTE — IMPRESSION/PLAN
Impression: Other specified glaucoma POAG Mild  OD
- mixed mech Moderate OS (CACG, OAG, ster resp) Plan: Under the care of Dr. Wilbur Alvarez. 
Drops: CONTINUE  PF Cosopt BID OU, Zioptan QHS OU, and Durezol QID OS

## 2023-02-09 NOTE — IMPRESSION/PLAN
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 Plan: Per Dr. Prince Aquino: patient lost to f/u after sending to glaucoma, complicated postop course with extensive fibrin formation and subsequent corneal decompensation. s/p IVTA 8/2021, s/p Ozurdex OS 12/2021. Likely has significant underlying CME, will need treatment but will need to coordinate with cornea to determine best course of action. [facility]. Explained to pt that she is higher risk for rejection given chronic uveitis. Will continue durezol postoperatiely, and administer decadron intraop/postop.   Encouraged to get systemic labs order last visit; will need to review before surgery

## 2023-02-09 NOTE — IMPRESSION/PLAN
Impression: Secondary corneal edema, left eye: T88.202. Plan: View now too poor and AC too shallow with significant pupillary abnormalities to offer DSEK. I would recommend PKP surgery. Visual rehabilitation can take a year or longer. Risks of Keratoplasty include similar risks to any intraocular surgery such as bleeding, cataract, and infection. Risks include rejection, need for additional surgery, need for glasses after, and the risks from the medications used. Steroids should not be stopped without instruction by a doctor. Information packet given. Questions answered. Visual potential unknown and improvement cannot be guaranteed given complex ocular history. Vision will be limited by uveitis, glaucoma and CME. Schedule PKP + synechiolysis + anterior chamber washout OS;  may need tube trimming as well. Will need TPA on standby.

## 2023-02-22 ENCOUNTER — OFFICE VISIT (OUTPATIENT)
Dept: URBAN - METROPOLITAN AREA CLINIC 10 | Facility: CLINIC | Age: 73
End: 2023-02-22
Payer: COMMERCIAL

## 2023-02-22 DIAGNOSIS — H40.89 OTHER SPECIFIED GLAUCOMA: ICD-10-CM

## 2023-02-22 DIAGNOSIS — H35.352 CYSTOID MACULAR DEGENERATION, LEFT EYE: ICD-10-CM

## 2023-02-22 PROCEDURE — 92012 INTRM OPH EXAM EST PATIENT: CPT | Performed by: OPHTHALMOLOGY

## 2023-02-22 RX ORDER — DIFLUPREDNATE OPHTHALMIC 0.5 MG/ML
0.05 % EMULSION OPHTHALMIC
Qty: 10 | Refills: 2 | Status: ACTIVE
Start: 2023-02-22

## 2023-02-22 RX ORDER — TAFLUPROST 0 MG/.3ML
0.0015 % SOLUTION/ DROPS OPHTHALMIC
Qty: 180 | Refills: 1 | Status: ACTIVE
Start: 2023-02-22

## 2023-02-22 ASSESSMENT — INTRAOCULAR PRESSURE
OD: 13
OS: 8

## 2023-02-24 ENCOUNTER — ADULT PHYSICAL (OUTPATIENT)
Dept: URBAN - METROPOLITAN AREA CLINIC 10 | Facility: CLINIC | Age: 73
End: 2023-02-24
Payer: COMMERCIAL

## 2023-02-24 DIAGNOSIS — Z01.818 ENCOUNTER FOR OTHER PREPROCEDURAL EXAMINATION: Primary | ICD-10-CM

## 2023-02-24 DIAGNOSIS — H18.232 SECONDARY CORNEAL EDEMA, LEFT EYE: ICD-10-CM

## 2023-02-24 DIAGNOSIS — H20.12 CHRONIC IRIDOCYCLITIS, LEFT EYE: ICD-10-CM

## 2023-02-24 PROCEDURE — 99213 OFFICE O/P EST LOW 20 MIN: CPT | Performed by: PHYSICIAN ASSISTANT

## 2023-03-08 ENCOUNTER — OFFICE VISIT (OUTPATIENT)
Dept: URBAN - METROPOLITAN AREA CLINIC 10 | Facility: CLINIC | Age: 73
End: 2023-03-08
Payer: COMMERCIAL

## 2023-03-08 DIAGNOSIS — H35.352 CYSTOID MACULAR DEGENERATION, LEFT EYE: ICD-10-CM

## 2023-03-08 DIAGNOSIS — H20.12 CHRONIC IRIDOCYCLITIS, LEFT EYE: ICD-10-CM

## 2023-03-08 DIAGNOSIS — Z48.810 ENCOUNTER FOR SURGICAL AFTERCARE FOLLOWING SURGERY ON A SENSE ORGAN: Primary | ICD-10-CM

## 2023-03-08 DIAGNOSIS — H40.89 OTHER SPECIFIED GLAUCOMA: ICD-10-CM

## 2023-03-08 PROCEDURE — 99024 POSTOP FOLLOW-UP VISIT: CPT | Performed by: OPHTHALMOLOGY

## 2023-03-08 RX ORDER — DORZOLAMIDE HYDROCHLORIDE AND TIMOLOL MALEATE 20; 5 MG/ML; MG/ML
SOLUTION/ DROPS OPHTHALMIC
Qty: 360 | Refills: 1 | Status: ACTIVE
Start: 2023-03-08

## 2023-03-08 RX ORDER — TAFLUPROST 0 MG/.3ML
0.0015 % SOLUTION/ DROPS OPHTHALMIC
Qty: 180 | Refills: 1 | Status: ACTIVE
Start: 2023-03-08

## 2023-03-08 RX ORDER — DUREZOL 0.5 MG/ML
0.05 % EMULSION OPHTHALMIC
Qty: 5 | Refills: 3 | Status: ACTIVE
Start: 2023-03-08

## 2023-03-09 NOTE — IMPRESSION/PLAN
Impression: Encounter for surgical aftercare following surgery on a sense organ  Z48.810. Plan: POW#1, doing well. Son here to translate.
- No recurrence of fibrin, however pt stopped durezol bc she ran out and was not approved for refills. Explained to pt and son the importance of compliance with medications. Informed pt that we will work with insurance to try to get medication approved so that she can resume durezol QID OS
- Pt c/o pain OS, relieved by T3 which she has Rx, but wants to know why surgery did not resolve pain. Explained to pt that her pain is unlikely from the cornea; this eye has chronic inflammation/uveitis and has had several surgeries. Cannot guarantee that pain will resolve with any surgery. If pain is unbearable and pt does not want further surgery and does not improve with drops, may need pain management or evisc/enuc/retrobulbar ETOH. - Pt concerned about corneal cloudiness. Explained to pt that my original recommendation was corneal transplantation, however pt declined this procedure and only wanted to proceed with AC washout with removal of membranes. .. Explained to pt that the cornea may remain cloudy without corneal transplantation. Goal of corneal transplantation is to improve view of fundus to allow for retina and glaucoma care, no guarantee that vision will improve. *** Given language barrier, I require that a certified  is present for EVERY CORNEA VISIT FROM NOW ON.

## 2023-03-09 NOTE — IMPRESSION/PLAN
Impression: Other specified glaucoma POAG Mild  OD
- mixed mech Moderate OS (CACG, OAG, ster resp) Plan: Under the care of Dr. Natali Ralph. 
Drops: CONTINUE  PF Cosopt BID OU, Zioptan QHS OU, and Durezol QID OS

## 2023-03-09 NOTE — IMPRESSION/PLAN
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
- Systemic labs neg, HLA-B27 not dine dt cost.
- s/p IVTA 8/2021, s/p Ozurdex OS 12/2021.  Plan: See above for plan

## 2023-04-12 ENCOUNTER — OFFICE VISIT (OUTPATIENT)
Dept: URBAN - METROPOLITAN AREA CLINIC 44 | Facility: CLINIC | Age: 73
End: 2023-04-12
Payer: COMMERCIAL

## 2023-04-12 DIAGNOSIS — H35.352 CYSTOID MACULAR DEGENERATION, LEFT EYE: ICD-10-CM

## 2023-04-12 DIAGNOSIS — H40.89 OTHER SPECIFIED GLAUCOMA: ICD-10-CM

## 2023-04-12 DIAGNOSIS — Z48.810 ENCOUNTER FOR SURGICAL AFTERCARE FOLLOWING SURGERY ON A SENSE ORGAN: Primary | ICD-10-CM

## 2023-04-12 DIAGNOSIS — H20.12 CHRONIC IRIDOCYCLITIS, LEFT EYE: ICD-10-CM

## 2023-04-12 PROCEDURE — 99024 POSTOP FOLLOW-UP VISIT: CPT | Performed by: OPHTHALMOLOGY

## 2023-04-12 ASSESSMENT — INTRAOCULAR PRESSURE: OS: 17

## 2023-04-12 NOTE — IMPRESSION/PLAN
Impression: Encounter for surgical aftercare following surgery on a sense organ  Z48.810. Plan: *** present, also with daughter today _Explained to pt that the cloudy appearance of the cornea is dt corneal decompensation/K edema. She is bothered by the appearance. Only tx is PKP surgery. Visual rehabilitation can take a year or longer. Risks of Keratoplasty include similar risks to any intraocular surgery such as bleeding, cataract, and infection. Risks include rejection, need for additional surgery, need for glasses after, and the risks from the medications used. Steroids should not be stopped without instruction by a doctor. Information packet given. Questions answered. Visual potential guarded, and there is no guarantee of improvement in vision. Discussed risk of worsening glaucoma, uveitis and CME with PKP surgery,  Discussed increased risk of rejection given uveitis. Goal of surgery is to improve view for retina and glaucoma care. - pt states that she will be leaving to go to [place] for 1 year. Explained that it is not advised to leave after surgery given nature of case. - pt is not certain that she want corneal transplantation at thsi time. May return to cornea when she desires surgery, other return to [facility] for monitoring. *** Given language barrier, I require that a certified  is present for EVERY CORNEA VISIT FROM NOW ON.

## 2023-04-12 NOTE — IMPRESSION/PLAN
Impression: Other specified glaucoma POAG Mild  OD
- mixed mech Moderate OS (CACG, OAG, ster resp) Plan: Under the care of Dr. Boy Capps. 
Drops: CONTINUE  PF Cosopt BID OU, Zioptan QHS OU, and pred BID OS

## 2023-05-02 ENCOUNTER — OFFICE VISIT (OUTPATIENT)
Facility: LOCATION | Age: 73
End: 2023-05-02
Payer: COMMERCIAL

## 2023-05-02 DIAGNOSIS — H18.20 CORNEAL EDEMA: ICD-10-CM

## 2023-05-02 DIAGNOSIS — H20.12 CHRONIC IRIDOCYCLITIS, LEFT EYE: ICD-10-CM

## 2023-05-02 DIAGNOSIS — H40.1123 PRIMARY OPEN-ANGLE GLAUCOMA, SEVERE STAGE, LEFT EYE: Primary | ICD-10-CM

## 2023-05-02 DIAGNOSIS — H35.352 CYSTOID MACULAR DEGENERATION, LEFT EYE: ICD-10-CM

## 2023-05-02 DIAGNOSIS — H40.021 OPEN ANGLE WITH BORDERLINE FINDINGS, HIGH RISK, RIGHT EYE: ICD-10-CM

## 2023-05-02 PROCEDURE — 92083 EXTENDED VISUAL FIELD XM: CPT | Performed by: STUDENT IN AN ORGANIZED HEALTH CARE EDUCATION/TRAINING PROGRAM

## 2023-05-02 PROCEDURE — 92014 COMPRE OPH EXAM EST PT 1/>: CPT | Performed by: STUDENT IN AN ORGANIZED HEALTH CARE EDUCATION/TRAINING PROGRAM

## 2023-05-02 RX ORDER — PREDNISOLONE ACETATE 10 MG/ML
1 % SUSPENSION/ DROPS OPHTHALMIC
Qty: 30 | Refills: 4 | Status: ACTIVE
Start: 2023-05-02

## 2023-05-02 ASSESSMENT — INTRAOCULAR PRESSURE
OS: 12
OD: 13

## 2023-05-02 NOTE — IMPRESSION/PLAN
Impression: Primary open-angle glaucoma, severe stage, left eye: H40.1123. Plan: *** and son present today*** Ocular Surgical History: s/p IOL OU, Baerveldt OS, AC washout w/ ant vit Pachy: [phone] Tmax: 27/49 Target IOP: low to mid teens OU Med Allergies: none Heart / Lung / Kidney disease/sulfa allergy: Pt. denies Gonioscopy: 
OCT: OD: 81 wnl/ no view OS
HVF: OD: MD -1 grossly full / OS: MD -26 diffuse depression - cornea contributing os C/D: .65/.75 Better seeing eye:  OD
IOP Today: 13/12 Plan: IOP today is great OU off drops. No new treatment being implemented at this time. Will continue to monitor. Discussed monocular precautions. Recommend and stressed importance of patient getting glaucoma consult and follow where she will be going. Drops: NONE Discussed natural history of glaucoma and that irreversible vision loss can occur without adequate IOP control. Emphasized compliance with eyedrops and other treatment described above.

## 2023-05-02 NOTE — IMPRESSION/PLAN
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
- Systemic labs neg, HLA-B27 not dine dt cost.
- s/p IVTA 8/2021, s/p Ozurdex OS 12/2021. Plan: Aqueous culture from 3/2/23 shows no growth of bacteria or fungus. - non infectious Continue PF BID OS per cornea. Continue care with retina.

## 2023-05-02 NOTE — IMPRESSION/PLAN
Impression: Corneal edema: H18.20. K edema OS Plan: Patient instructed previously by Dr. Елена Bustamante not to stop steroid. Patient stopped because she ran out. Patient to RESTART Prednisolone BID OS. Stressed importance not to discontinue steroid w/o doctor instructions.

## 2023-07-25 NOTE — IMPRESSION/PLAN
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 Plan: Per Dr. Amita Araujo: patient lost to f/u after sending to glaucoma, complicated postop course with extensive fibrin formation and subsequent corneal decompensation. s/p IVTA 8/2021, s/p Ozurdex OS 12/2021. Likely has significant underlying CME, will need treatment but will need to coordinate with cornea to determine best course of action. [place] obtain [place] treatment. 

Proceed with AC washout
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 Plan: Per Dr. Tommy Keen: patient lost to f/u after sending to glaucoma, complicated postop course with extensive fibrin formation and subsequent corneal decompensation. s/p IVTA 8/2021, s/p Ozurdex OS 12/2021. Likely has significant underlying CME, will need treatment but will need to coordinate with cornea to determine best course of action. Will obtain [facility] authorization for treatment. Explained to pt that she is higher risk for rejection given chronic uveitis. Will continue durezol postoperatiely, and administer decadron intraop/postop.   Systemic labs neg, HLA-B27 not dine dt cost.
Impression: Cystoid macular degeneration, left eye: H35.352.  Plan: see above
Impression: Other specified glaucoma POAG Mild  OD
- mixed mech Moderate OS (CACG, OAG, ster resp) Plan: Under the care of Dr. Uvaldo Baumann. 
Drops: CONTINUE  PF Cosopt BID OU, Zioptan QHS OU, and Durezol QID OS

**will reach out to glaucoma to determine if zioptan can cause inflammation
Impression: Secondary corneal edema, left eye: Y01.497. Plan: Ant seg shallow with large pupillary defect/adhesions decreasing likelihood of success with DSEK; PKP recommended. Pt has discussed PKP with family, and would prefer to defer PKP and to proceed with AC washout only to clear the white. Explained to pt that the greyish appearance to her eye is due to corneal edema and AC fibrin; removal of AC fibrin may not resolve the opaque appearance of her eye.,  Also explained to pt and son that corneal decompensation will continue to progress and she may ultimately need PKP; goal of PKP would be to improve posterior view for retina and glaucoma evaluations. Pt and son understand and decline PKP at this time. Proceed with AC paracentesis + culture + administration of intracameral TPA OS.   Will plan for IV decadron in recovery, and durezol postoperatively
No